# Patient Record
Sex: FEMALE | Race: WHITE | NOT HISPANIC OR LATINO | URBAN - METROPOLITAN AREA
[De-identification: names, ages, dates, MRNs, and addresses within clinical notes are randomized per-mention and may not be internally consistent; named-entity substitution may affect disease eponyms.]

---

## 2022-09-19 ENCOUNTER — EMERGENCY (EMERGENCY)
Facility: HOSPITAL | Age: 42
LOS: 0 days | Discharge: HOME | End: 2022-09-20
Attending: EMERGENCY MEDICINE | Admitting: EMERGENCY MEDICINE

## 2022-09-19 VITALS
TEMPERATURE: 98 F | DIASTOLIC BLOOD PRESSURE: 64 MMHG | HEART RATE: 67 BPM | RESPIRATION RATE: 18 BRPM | SYSTOLIC BLOOD PRESSURE: 150 MMHG | OXYGEN SATURATION: 99 % | WEIGHT: 160.06 LBS

## 2022-09-19 DIAGNOSIS — Z91.040 LATEX ALLERGY STATUS: ICD-10-CM

## 2022-09-19 DIAGNOSIS — R07.9 CHEST PAIN, UNSPECIFIED: ICD-10-CM

## 2022-09-19 DIAGNOSIS — R07.89 OTHER CHEST PAIN: ICD-10-CM
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
## 2022-09-19 DIAGNOSIS — M25.562 PAIN IN LEFT KNEE: ICD-10-CM

## 2022-09-19 DIAGNOSIS — Z85.41 PERSONAL HISTORY OF MALIGNANT NEOPLASM OF CERVIX UTERI: ICD-10-CM
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
## 2022-09-19 DIAGNOSIS — Z88.0 ALLERGY STATUS TO PENICILLIN: ICD-10-CM

## 2022-09-19 LAB
ALBUMIN SERPL ELPH-MCNC: 4.7 G/DL — SIGNIFICANT CHANGE UP (ref 3.5–5.2)
ALP SERPL-CCNC: 109 U/L — SIGNIFICANT CHANGE UP (ref 30–115)
ALT FLD-CCNC: 14 U/L — SIGNIFICANT CHANGE UP (ref 0–41)
ANION GAP SERPL CALC-SCNC: 12 MMOL/L — SIGNIFICANT CHANGE UP (ref 7–14)
AST SERPL-CCNC: 26 U/L — SIGNIFICANT CHANGE UP (ref 0–41)
BASOPHILS # BLD AUTO: 0.06 K/UL — SIGNIFICANT CHANGE UP (ref 0–0.2)
BASOPHILS NFR BLD AUTO: 0.6 % — SIGNIFICANT CHANGE UP (ref 0–1)
BILIRUB SERPL-MCNC: 0.5 MG/DL — SIGNIFICANT CHANGE UP (ref 0.2–1.2)
BUN SERPL-MCNC: 7 MG/DL — LOW (ref 10–20)
CALCIUM SERPL-MCNC: 9.9 MG/DL — SIGNIFICANT CHANGE UP (ref 8.4–10.4)
CHLORIDE SERPL-SCNC: 103 MMOL/L — SIGNIFICANT CHANGE UP (ref 98–110)
CO2 SERPL-SCNC: 25 MMOL/L — SIGNIFICANT CHANGE UP (ref 17–32)
CREAT SERPL-MCNC: 0.6 MG/DL — LOW (ref 0.7–1.5)
EGFR: 117 ML/MIN/1.73M2 — SIGNIFICANT CHANGE UP
EOSINOPHIL # BLD AUTO: 0.36 K/UL — SIGNIFICANT CHANGE UP (ref 0–0.7)
EOSINOPHIL NFR BLD AUTO: 3.6 % — SIGNIFICANT CHANGE UP (ref 0–8)
GLUCOSE SERPL-MCNC: 80 MG/DL — SIGNIFICANT CHANGE UP (ref 70–99)
HCG SERPL QL: NEGATIVE — SIGNIFICANT CHANGE UP
HCT VFR BLD CALC: 44 % — SIGNIFICANT CHANGE UP (ref 37–47)
HGB BLD-MCNC: 14.8 G/DL — SIGNIFICANT CHANGE UP (ref 12–16)
IMM GRANULOCYTES NFR BLD AUTO: 0.2 % — SIGNIFICANT CHANGE UP (ref 0.1–0.3)
LYMPHOCYTES # BLD AUTO: 2.95 K/UL — SIGNIFICANT CHANGE UP (ref 1.2–3.4)
LYMPHOCYTES # BLD AUTO: 29.4 % — SIGNIFICANT CHANGE UP (ref 20.5–51.1)
MCHC RBC-ENTMCNC: 30.8 PG — SIGNIFICANT CHANGE UP (ref 27–31)
MCHC RBC-ENTMCNC: 33.6 G/DL — SIGNIFICANT CHANGE UP (ref 32–37)
MCV RBC AUTO: 91.5 FL — SIGNIFICANT CHANGE UP (ref 81–99)
MONOCYTES # BLD AUTO: 0.91 K/UL — HIGH (ref 0.1–0.6)
MONOCYTES NFR BLD AUTO: 9.1 % — SIGNIFICANT CHANGE UP (ref 1.7–9.3)
NEUTROPHILS # BLD AUTO: 5.74 K/UL — SIGNIFICANT CHANGE UP (ref 1.4–6.5)
NEUTROPHILS NFR BLD AUTO: 57.1 % — SIGNIFICANT CHANGE UP (ref 42.2–75.2)
NRBC # BLD: 0 /100 WBCS — SIGNIFICANT CHANGE UP (ref 0–0)
PLATELET # BLD AUTO: 239 K/UL — SIGNIFICANT CHANGE UP (ref 130–400)
POTASSIUM SERPL-MCNC: 4.5 MMOL/L — SIGNIFICANT CHANGE UP (ref 3.5–5)
POTASSIUM SERPL-SCNC: 4.5 MMOL/L — SIGNIFICANT CHANGE UP (ref 3.5–5)
PROT SERPL-MCNC: 7.3 G/DL — SIGNIFICANT CHANGE UP (ref 6–8)
RBC # BLD: 4.81 M/UL — SIGNIFICANT CHANGE UP (ref 4.2–5.4)
RBC # FLD: 13.2 % — SIGNIFICANT CHANGE UP (ref 11.5–14.5)
SODIUM SERPL-SCNC: 140 MMOL/L — SIGNIFICANT CHANGE UP (ref 135–146)
TROPONIN T SERPL-MCNC: <0.01 NG/ML — SIGNIFICANT CHANGE UP
WBC # BLD: 10.04 K/UL — SIGNIFICANT CHANGE UP (ref 4.8–10.8)
WBC # FLD AUTO: 10.04 K/UL — SIGNIFICANT CHANGE UP (ref 4.8–10.8)

## 2022-09-19 PROCEDURE — 99285 EMERGENCY DEPT VISIT HI MDM: CPT

## 2022-09-19 PROCEDURE — 73564 X-RAY EXAM KNEE 4 OR MORE: CPT | Mod: 26,LT

## 2022-09-19 PROCEDURE — 71046 X-RAY EXAM CHEST 2 VIEWS: CPT | Mod: 26

## 2022-09-19 PROCEDURE — 93010 ELECTROCARDIOGRAM REPORT: CPT

## 2022-09-19 RX ORDER — KETOROLAC TROMETHAMINE 30 MG/ML
30 SYRINGE (ML) INJECTION ONCE
Refills: 0 | Status: DISCONTINUED | OUTPATIENT
Start: 2022-09-19 | End: 2022-09-19

## 2022-09-19 RX ADMIN — Medication 30 MILLIGRAM(S): at 21:50

## 2022-09-19 NOTE — ED ADULT NURSE NOTE - CHIEF COMPLAINT QUOTE
Pt BIBA for left knee pain and abdominal pain. Pt under [facility] custody. denies n/v/d. Pt fell a few weeks ago.

## 2022-09-19 NOTE — ED PROVIDER NOTE - NSFOLLOWUPINSTRUCTIONS_ED_ALL_ED_FT
Chest Pain    Chest pain can be caused by many different conditions which may or may not be dangerous. Causes include heartburn, lung infections, heart attack, blood clot in lungs, skin infections, strain or damage to muscle, cartilage, or bones, etc. In addition to a history and physical examination, an electrocardiogram (ECG) or other lab tests may have been performed to determine the cause of your chest pain. Follow up with your primary care provider or with a cardiologist as instructed.     SEEK IMMEDIATE MEDICAL CARE IF YOU HAVE ANY OF THE FOLLOWING SYMPTOMS: worsening chest pain, coughing up blood, unexplained back/neck/jaw pain, severe abdominal pain, dizziness or lightheadedness, fainting, shortness of breath, sweaty or clammy skin, vomiting, or racing heart beat. These symptoms may represent a serious problem that is an emergency. Do not wait to see if the symptoms will go away. Get medical help right away. Call 911 and do not drive yourself to the hospital.      Acute Knee Pain, Adult      Acute knee pain is sudden and may be caused by damage, swelling, or irritation of the muscles and tissues that support the knee. Pain may result from:  •A fall.      •An injury to the knee from twisting motions.      •A hit to the knee.      •Infection.      Acute knee pain may go away on its own with time and rest. If it does not, your health care provider may order tests to find the cause of the pain. These may include:  •Imaging tests, such as an X-ray, MRI, CT scan, or ultrasound.      •Joint aspiration. In this test, fluid is removed from the knee and evaluated.      •Arthroscopy. In this test, a lighted tube is inserted into the knee and an image is projected onto a TV screen.      •Biopsy. In this test, a sample of tissue is removed from the body and studied under a microscope.        Follow these instructions at home:      If you have a knee sleeve or brace:      •Wear the knee sleeve or brace as told by your health care provider. Remove it only as told by your health care provider.      •Loosen it if your toes tingle, become numb, or turn cold and blue.      •Keep it clean.    •If the knee sleeve or brace is not waterproof:  •Do not let it get wet.      •Cover it with a watertight covering when you take a bath or shower.        Activity     •Rest your knee.      • Do not do things that cause pain or make pain worse.      •Avoid high-impact activities or exercises, such as running, jumping rope, or doing jumping jacks.      •Work with a physical therapist to make a safe exercise program, as recommended by your health care provider. Do exercises as told by your physical therapist.        Managing pain, stiffness, and swelling    •If directed, put ice on the affected knee. To do this:  •If you have a removable knee sleeve or brace, remove it as told by your health care provider.      •Put ice in a plastic bag.      •Place a towel between your skin and the bag.      •Leave the ice on for 20 minutes, 2–3 times a day.      •Remove the ice if your skin turns bright red. This is very important. If you cannot feel pain, heat, or cold, you have a greater risk of damage to the area.        •If directed, use an elastic bandage to put pressure (compression) on your injured knee. This may control swelling, give support, and help with discomfort.      •Raise (elevate) your knee above the level of your heart while you are sitting or lying down.      •Sleep with a pillow under your knee.      General instructions     •Take over-the-counter and prescription medicines only as told by your health care provider.      • Do not use any products that contain nicotine or tobacco, such as cigarettes, e-cigarettes, and chewing tobacco. If you need help quitting, ask your health care provider.      •If you are overweight, work with your health care provider and a dietitian to set a weight-loss goal that is healthy and reasonable for you. Extra weight can put pressure on your knee.      •Pay attention to any changes in your symptoms.      •Keep all follow-up visits. This is important.        Contact a health care provider if:    •Your knee pain continues, changes, or gets worse.      •You have a fever along with knee pain.      •Your knee feels warm to the touch or is red.      •Your knee isac or locks up.        Get help right away if:    •Your knee swells, and the swelling becomes worse.      •You cannot move your knee.      •You have severe pain in your knee that cannot be managed with pain medicine.        Summary    •Acute knee pain can be caused by a fall, an injury, an infection, or damage, swelling, or irritation of the tissues that support your knee.      •Your health care provider may perform tests to find out the cause of the pain.      •Pay attention to any changes in your symptoms. Relieve your pain with rest, medicines, light activity, and the use of ice.      •Get help right away if your knee swells, you cannot move your knee, or you have severe pain that cannot be managed with medicine.      This information is not intended to replace advice given to you by your health care provider. Make sure you discuss any questions you have with your health care provider.

## 2022-09-19 NOTE — ED PROVIDER NOTE - OBJECTIVE STATEMENT
40 yo female, PMHx of cervical cancer, presents with sudden on chest pain that started earlier this afternoon when she was being arrested, no alleviating or aggravating factors, associated with left knee pain for a few weeks after she fell with a "locking sensation." Denies nausea, vomiting, dizziness, shortness of breath, abdominal pain.

## 2022-09-19 NOTE — ED PROVIDER NOTE - PHYSICAL EXAMINATION
CONSTITUTIONAL: disheveled  SKIN: warm, dry  HEAD: Normocephalic; atraumatic.  NECK: Supple; non tender.  CARD: S1, S2 normal; Regular rate and rhythm.   RESP: No wheezes, rales or rhonchi.  ABD: soft ntnd  EXT: Normal ROM.  No clubbing, cyanosis or edema. ambulates independently   NEURO: Alert, oriented, grossly unremarkable.  PSYCH: Cooperative, appropriate. CONSTITUTIONAL: no acute distress  SKIN: warm, dry  HEAD: Normocephalic; atraumatic.  NECK: Supple; non tender.  CARD: S1, S2 normal; Regular rate and rhythm.   RESP: No wheezes, rales or rhonchi.  ABD: soft ntnd  EXT: Normal ROM.  No clubbing, cyanosis or edema. ambulates independently   NEURO: Alert, oriented, grossly unremarkable.  PSYCH: Cooperative, appropriate.

## 2022-09-19 NOTE — ED PROVIDER NOTE - PATIENT PORTAL LINK FT
You can access the FollowMyHealth Patient Portal offered by [facility] by registering at the following website: [email or web address]. By joining [employer]’s FollowMyHealth portal, you will also be able to view your health information using other applications (apps) compatible with our system.

## 2022-09-19 NOTE — ED PROVIDER NOTE - ATTENDING CONTRIBUTION TO CARE
41F pmh cervical ca, presents in [facility] custody c/o primarily L knee pain. states she fell on it 2 weeks ago and has had sharp constant nonradiating pain ever since. no numbness, weakness. no swelling. able to bear weight but with pain. also c/o L sided sharp cp today started after getting arrested around 2 pm last a few mins. sharp nonradiating intermittent. no sob, palp, n/v, diaphoresis. no tearing bp. no fever, cough. no trauma today. no le edema, le pain immobilization, hormones, hemoptysis. pt states knee pain is worse.     on exam, AFVSS, well olimpia nad, ncat, eomi, perrla, mmm, lctab, rrr nl s1s2 no mrg, abd soft ntnd, aaox3, no focal deficits, no le edema or calf ttp,    a/p; L knee pain, CP, will do labs, ekg/trop, cxr, knee xr, toradol re-eval. PERC neg.

## 2022-09-19 NOTE — ED PROVIDER NOTE - CLINICAL SUMMARY MEDICAL DECISION MAKING FREE TEXT BOX
pt feels better, ed work up unremarkable, dc w supportive care, f/u pmd/cards 1-2 weeks, strict return precautions

## 2022-09-19 NOTE — ED ADULT TRIAGE NOTE - CHIEF COMPLAINT QUOTE
Pt BIBA for left knee pain and abdominal pain. Pt under [facility] custody. denies n/v/d. Pt fell a few weeks ago.

## 2022-09-19 NOTE — ED PROVIDER NOTE - NSFOLLOWUPCLINICS_GEN_ALL_ED_FT
[facility]  Cardiac Rehab  [address] [place] [address]  Phone: [phone]  Fax:   Follow Up Time: Routine

## 2022-10-31 NOTE — ED ADULT TRIAGE NOTE - WEIGHT IN KG
72.6 PAST MEDICAL HISTORY:  Alcohol abuse     Anxiety     Depression     Overdose heroine and xanax 3/10/2015

## 2024-01-15 ENCOUNTER — EMERGENCY (EMERGENCY)
Facility: HOSPITAL | Age: 44
LOS: 0 days | Discharge: HOME | End: 2024-01-15
Attending: EMERGENCY MEDICINE
Payer: MEDICAID

## 2024-01-15 VITALS
WEIGHT: 171.08 LBS | HEART RATE: 65 BPM | RESPIRATION RATE: 16 BRPM | SYSTOLIC BLOOD PRESSURE: 119 MMHG | OXYGEN SATURATION: 100 % | TEMPERATURE: 98 F | DIASTOLIC BLOOD PRESSURE: 88 MMHG

## 2024-01-15 DIAGNOSIS — M79.89 OTHER SPECIFIED SOFT TISSUE DISORDERS: ICD-10-CM

## 2024-01-15 DIAGNOSIS — R20.2 PARESTHESIA OF SKIN: ICD-10-CM

## 2024-01-15 DIAGNOSIS — Z91.040 LATEX ALLERGY STATUS: ICD-10-CM

## 2024-01-15 DIAGNOSIS — Z88.0 ALLERGY STATUS TO PENICILLIN: ICD-10-CM

## 2024-01-15 DIAGNOSIS — M79.602 PAIN IN LEFT ARM: ICD-10-CM

## 2024-01-15 DIAGNOSIS — M25.512 PAIN IN LEFT SHOULDER: ICD-10-CM

## 2024-01-15 PROCEDURE — 99284 EMERGENCY DEPT VISIT MOD MDM: CPT

## 2024-01-15 PROCEDURE — 99284 EMERGENCY DEPT VISIT MOD MDM: CPT | Mod: 25

## 2024-01-15 PROCEDURE — 93010 ELECTROCARDIOGRAM REPORT: CPT

## 2024-01-15 PROCEDURE — 71045 X-RAY EXAM CHEST 1 VIEW: CPT | Mod: 26

## 2024-01-15 PROCEDURE — 93005 ELECTROCARDIOGRAM TRACING: CPT

## 2024-01-15 PROCEDURE — 73030 X-RAY EXAM OF SHOULDER: CPT | Mod: 26,LT

## 2024-01-15 PROCEDURE — 71045 X-RAY EXAM CHEST 1 VIEW: CPT

## 2024-01-15 PROCEDURE — 73030 X-RAY EXAM OF SHOULDER: CPT | Mod: LT

## 2024-01-15 RX ORDER — IBUPROFEN 200 MG
600 TABLET ORAL ONCE
Refills: 0 | Status: DISCONTINUED | OUTPATIENT
Start: 2024-01-15 | End: 2024-01-15

## 2024-01-15 NOTE — ED PROVIDER NOTE - NSPTACCESSSVCSAPPTDETAILS_ED_ALL_ED_FT
Please refer for hx of cervical cancer 17 yrs ago and pt would like to check in   Please refer for phyical therapy for shoulder pain 1. Please refer for hx of cervical cancer 17 yrs ago and pt would like to check in   2. Please refer for phyical therapy for shoulder pain.  3. Needs PMD

## 2024-01-15 NOTE — ED PROVIDER NOTE - CLINICAL SUMMARY MEDICAL DECISION MAKING FREE TEXT BOX
43 y.o. female, PMH of cervical cancer in remission, presents for 2 days of shoulder pain and tingling.  Patient states she woke up in the morning with arm pain, mostly over forearm. No limitation of ROM. Patient denies any trauma to her neck or arm. No fever/chills. No CP/SOB. No IVDA. No HA. No skin changes. On exam, pt in NAD, AAOx3, head NC/AT, CN II-XII intact, PEERL, EOMi, neck (-) midline tenderness, lungs CTA B/L, CV S1S2 regular, abdomen soft/NT/ND/(+)BS, ext (-) edema/deformity, FROM of b/l shoulders/elbows/wrist, motor 5/5x4, sensation intact, ambulating with steady gait. XR done and reviewed- no acute pathology. Will d/c with PT and neuro follow up. Will do referral for PMD as well.

## 2024-01-15 NOTE — ED PROVIDER NOTE - NSFOLLOWUPINSTRUCTIONS_ED_ALL_ED_FT
Arm Pain    WHAT YOU NEED TO KNOW:    Your arm pain may be caused by a number of conditions. Examples include arthritis, nerve problems, or an awkward position while you sleep. X-rays did not show a broken bone in your arm or wrist. Arm pain may be a sign of a serious condition that needs immediate care, such as a heart attack.    DISCHARGE INSTRUCTIONS:    Call your local emergency number (911 in the [place]) for any of the following:    You have any of the following signs of a heart attack:  Squeezing, pressure, or pain in your chest    You may also have any of the following:  Discomfort or pain in your back, neck, jaw, stomach, or arm    Shortness of breath    Nausea or vomiting    Lightheadedness or a sudden cold sweat    Return to the emergency department if:    You have severe pain, or pain that spreads from your arm to other areas.    You have swelling, tingling, or numbness in your hand or fingers, or the skin turns blue.    You cannot move your arm.  Call your doctor if:    You have questions or concerns about your condition or care.    Medicines: You may need any of the following:    Prescription pain medicine may be given. Ask your healthcare provider how to take this medicine safely. Some prescription pain medicines contain acetaminophen. Do not take other medicines that contain acetaminophen without talking to your healthcare provider. Too much acetaminophen may cause liver damage. Prescription pain medicine may cause constipation. Ask your healthcare provider how to prevent or treat constipation.    NSAIDs, such as ibuprofen, help decrease swelling, pain, and fever. This medicine is available with or without a doctor's order. NSAIDs can cause stomach bleeding or kidney problems in certain people. If you take blood thinner medicine, always ask your healthcare provider if NSAIDs are safe for you. Always read the medicine label and follow directions.    Take your medicine as directed. Contact your healthcare provider if you think your medicine is not helping or if you have side effects. Tell your provider if you are allergic to any medicine. Keep a list of the medicines, vitamins, and herbs you take. Include the amounts, and when and why you take them. Bring the list or the pill bottles to follow-up visits. Carry your medicine list with you in case of an emergency.  Self-care:    Rest your arm as directed. A sling may be used to keep your arm from moving while it heals.    Apply ice as directed. Ice helps decrease pain and swelling. Ice may also help prevent tissue damage. Use an ice pack, or put crushed ice in a plastic bag. Cover it with a towel. Apply it to your arm for 20 minutes every few hours, or as directed. Ask how many times to apply ice each day, and for how many days.    Elevate your arm above the level of your heart as often as you can. This will help decrease swelling and pain. Prop your arm on pillows or blankets to keep the area elevated comfortably.        Adjust your position if you work in front of a computer. You may need arm or wrist supports or change the height of your chair.    Keep a pain record. Write down when your pain happens and how severe it is. Include any other symptoms you have with your pain. A record will help you keep track of pain cycles. Bring the record with you to your follow-up visits. It may also help your healthcare provider find out what is causing your pain.  Follow up with your doctor as directed: You may need physical therapy. You may need to see an orthopedic specialist. Write down your questions so you remember to ask them during your visits.    © [employer] 1973, 2024

## 2024-01-15 NOTE — ED ADULT NURSE NOTE - NSFALLUNIVINTERV_ED_ALL_ED
Assistance with ambulation/Monitor gait and stability/Monitor for mental status changes and reorient to person, place, and time, as needed/Provide visual cue: red socks, yellow wristband, yellow gown, etc/Reinforce activity limits and safety measures with patient and family/Use of alarms - bed, stretcher, chair and/or video monitoring/Bed/Stretcher in lowest position, wheels locked, appropriate side rails in place/Call bell, personal items and telephone in reach/Instruct patient to call for assistance before getting out of bed/chair/stretcher/Non-slip footwear applied when patient is off stretcher/[place] to call system/Physically safe environment - no spills, clutter or unnecessary equipment/Purposeful proactive rounding/Room/bathroom lighting operational, light cord in reach Assistance with ambulation/Monitor gait and stability/Monitor for mental status changes and reorient to person, place, and time, as needed/Provide visual cue: red socks, yellow wristband, yellow gown, etc/Reinforce activity limits and safety measures with patient and family/Use of alarms - bed, stretcher, chair and/or video monitoring/Bed/Stretcher in lowest position, wheels locked, appropriate side rails in place/Call bell, personal items and telephone in reach/Instruct patient to call for assistance before getting out of bed/chair/stretcher/Non-slip footwear applied when patient is off stretcher/[place] to call system/Physically safe environment - no spills, clutter or unnecessary equipment/Purposeful proactive rounding/Room/bathroom lighting operational, light cord in reach Assistance with ambulation/Monitor gait and stability/Monitor for mental status changes and reorient to person, place, and time, as needed/Provide visual cue: red socks, yellow wristband, yellow gown, etc/Reinforce activity limits and safety measures with patient and family/Use of alarms - bed, stretcher, chair and/or video monitoring/Bed/Stretcher in lowest position, wheels locked, appropriate side rails in place/Call bell, personal items and telephone in reach/Instruct patient to call for assistance before getting out of bed/chair/stretcher/Non-slip footwear applied when patient is off stretcher/[place] to call system/Physically safe environment - no spills, clutter or unnecessary equipment/Purposeful proactive rounding/Room/bathroom lighting operational, light cord in reach

## 2024-01-15 NOTE — ED PROVIDER NOTE - PATIENT PORTAL LINK FT
You can access the FollowMyHealth Patient Portal offered by [facility] by registering at the following website: [email or web address]. By joining [employer]’s FollowMyHealth portal, you will also be able to view your health information using other applications (apps) compatible with our system. You can access the FollowMyHealth Patient Portal offered by [facility] by registering at the following website: [email or web address]. By joining [employer]’s FollowMyHealth portal, you will also be able to view your health information using other applications (apps) compatible with our system. You can access the FollowMyHealth Patient Portal offered by [facility] by registering at the following website: [email or web address]. By joining [employer]’s FollowMyHealth portal, you will also be able to view your health information using other applications (apps) compatible with our system.

## 2024-01-15 NOTE — ED PROVIDER NOTE - OBJECTIVE STATEMENT
43-year-old female past medical history of cervical cancer in remission presents for 2 days of shoulder pain and tingling.  Patient states she woke up in the morning and her arm hurt extra is able to have full range of motion but she thought her distal hand was swollen.  Patient denies any trauma to the cervical or arm regions fevers chills chest pain shortness of breath.

## 2024-01-15 NOTE — ED ADULT TRIAGE NOTE - CHIEF COMPLAINT QUOTE
Left arm pain for "the past few days", pt reports when she wakes up in the mornings her arm is numb and she gets pins and needles, also reports some swelling. Denies injury.

## 2024-02-01 ENCOUNTER — INPATIENT (INPATIENT)
Facility: HOSPITAL | Age: 44
LOS: 3 days | Discharge: LEFT BEFORE DISCHARGE | DRG: 816 | End: 2024-02-05
Attending: INTERNAL MEDICINE | Admitting: INTERNAL MEDICINE
Payer: MEDICAID

## 2024-02-01 VITALS
SYSTOLIC BLOOD PRESSURE: 175 MMHG | HEART RATE: 88 BPM | OXYGEN SATURATION: 94 % | DIASTOLIC BLOOD PRESSURE: 110 MMHG | WEIGHT: 149.91 LBS | RESPIRATION RATE: 88 BRPM | TEMPERATURE: 98 F

## 2024-02-01 DIAGNOSIS — T40.2X1A POISONING BY OTHER OPIOIDS, ACCIDENTAL (UNINTENTIONAL), INITIAL ENCOUNTER: ICD-10-CM

## 2024-02-01 LAB
ACANTHOCYTES BLD QL SMEAR: SLIGHT — SIGNIFICANT CHANGE UP
ALBUMIN SERPL ELPH-MCNC: 4.7 G/DL — SIGNIFICANT CHANGE UP (ref 3.5–5.2)
ALP SERPL-CCNC: 119 U/L — HIGH (ref 30–115)
ALT FLD-CCNC: 73 U/L — HIGH (ref 0–41)
ANION GAP SERPL CALC-SCNC: 17 MMOL/L — HIGH (ref 7–14)
APAP SERPL-MCNC: <5 UG/ML — LOW (ref 10–30)
AST SERPL-CCNC: 179 U/L — HIGH (ref 0–41)
BASE EXCESS BLDV CALC-SCNC: -11.4 MMOL/L — LOW (ref -2–3)
BASOPHILS # BLD AUTO: 0 K/UL — SIGNIFICANT CHANGE UP (ref 0–0.2)
BASOPHILS NFR BLD AUTO: 0 % — SIGNIFICANT CHANGE UP (ref 0–1)
BILIRUB SERPL-MCNC: 0.4 MG/DL — SIGNIFICANT CHANGE UP (ref 0.2–1.2)
BUN SERPL-MCNC: 12 MG/DL — SIGNIFICANT CHANGE UP (ref 10–20)
BURR CELLS BLD QL SMEAR: PRESENT — SIGNIFICANT CHANGE UP
CA-I SERPL-SCNC: 1.19 MMOL/L — SIGNIFICANT CHANGE UP (ref 1.15–1.33)
CALCIUM SERPL-MCNC: 9.2 MG/DL — SIGNIFICANT CHANGE UP (ref 8.4–10.5)
CHLORIDE SERPL-SCNC: 107 MMOL/L — SIGNIFICANT CHANGE UP (ref 98–110)
CO2 SERPL-SCNC: 18 MMOL/L — SIGNIFICANT CHANGE UP (ref 17–32)
CREAT SERPL-MCNC: 0.9 MG/DL — SIGNIFICANT CHANGE UP (ref 0.7–1.5)
EGFR: 82 ML/MIN/1.73M2 — SIGNIFICANT CHANGE UP
EOSINOPHIL # BLD AUTO: 0.21 K/UL — SIGNIFICANT CHANGE UP (ref 0–0.7)
EOSINOPHIL NFR BLD AUTO: 0.8 % — SIGNIFICANT CHANGE UP (ref 0–8)
ETHANOL SERPL-MCNC: 14 MG/DL — HIGH
GAS PNL BLDV: 134 MMOL/L — LOW (ref 136–145)
GAS PNL BLDV: SIGNIFICANT CHANGE UP
GIANT PLATELETS BLD QL SMEAR: PRESENT — SIGNIFICANT CHANGE UP
GLUCOSE SERPL-MCNC: 188 MG/DL — HIGH (ref 70–99)
HCO3 BLDV-SCNC: 20 MMOL/L — LOW (ref 22–29)
HCT VFR BLD CALC: 45.3 % — SIGNIFICANT CHANGE UP (ref 37–47)
HCT VFR BLDA CALC: 44 % — SIGNIFICANT CHANGE UP (ref 34.5–46.5)
HGB BLD CALC-MCNC: 14.5 G/DL — SIGNIFICANT CHANGE UP (ref 11.7–16.1)
HGB BLD-MCNC: 14.4 G/DL — SIGNIFICANT CHANGE UP (ref 12–16)
HYPOCHROMIA BLD QL: SLIGHT — SIGNIFICANT CHANGE UP
LACTATE BLDV-MCNC: 6.1 MMOL/L — CRITICAL HIGH (ref 0.5–2)
LIDOCAIN IGE QN: 64 U/L — HIGH (ref 7–60)
LYMPHOCYTES # BLD AUTO: 1.77 K/UL — SIGNIFICANT CHANGE UP (ref 1.2–3.4)
LYMPHOCYTES # BLD AUTO: 6.9 % — LOW (ref 20.5–51.1)
MAGNESIUM SERPL-MCNC: 2 MG/DL — SIGNIFICANT CHANGE UP (ref 1.8–2.4)
MANUAL SMEAR VERIFICATION: SIGNIFICANT CHANGE UP
MCHC RBC-ENTMCNC: 30.6 PG — SIGNIFICANT CHANGE UP (ref 27–31)
MCHC RBC-ENTMCNC: 31.8 G/DL — LOW (ref 32–37)
MCV RBC AUTO: 96.4 FL — SIGNIFICANT CHANGE UP (ref 81–99)
METAMYELOCYTES # FLD: 0.9 % — HIGH (ref 0–0)
MONOCYTES # BLD AUTO: 0.21 K/UL — SIGNIFICANT CHANGE UP (ref 0.1–0.6)
MONOCYTES NFR BLD AUTO: 0.8 % — LOW (ref 1.7–9.3)
NEUTROPHILS # BLD AUTO: 23.06 K/UL — HIGH (ref 1.4–6.5)
NEUTROPHILS NFR BLD AUTO: 88.8 % — HIGH (ref 42.2–75.2)
NEUTS BAND # BLD: 0.9 % — SIGNIFICANT CHANGE UP (ref 0–6)
PCO2 BLDV: 66 MMHG — HIGH (ref 39–42)
PH BLDV: 7.08 — CRITICAL LOW (ref 7.32–7.43)
PLAT MORPH BLD: ABNORMAL
PLATELET # BLD AUTO: 297 K/UL — SIGNIFICANT CHANGE UP (ref 130–400)
PMV BLD: 10.5 FL — HIGH (ref 7.4–10.4)
PO2 BLDV: 35 MMHG — SIGNIFICANT CHANGE UP (ref 25–45)
POIKILOCYTOSIS BLD QL AUTO: SLIGHT — SIGNIFICANT CHANGE UP
POLYCHROMASIA BLD QL SMEAR: SLIGHT — SIGNIFICANT CHANGE UP
POTASSIUM BLDV-SCNC: 5.8 MMOL/L — HIGH (ref 3.5–5.1)
POTASSIUM SERPL-MCNC: 4.7 MMOL/L — SIGNIFICANT CHANGE UP (ref 3.5–5)
POTASSIUM SERPL-SCNC: 4.7 MMOL/L — SIGNIFICANT CHANGE UP (ref 3.5–5)
PROT SERPL-MCNC: 7.2 G/DL — SIGNIFICANT CHANGE UP (ref 6–8)
RBC # BLD: 4.7 M/UL — SIGNIFICANT CHANGE UP (ref 4.2–5.4)
RBC # FLD: 12.7 % — SIGNIFICANT CHANGE UP (ref 11.5–14.5)
RBC BLD AUTO: ABNORMAL
SALICYLATES SERPL-MCNC: 0.8 MG/DL — LOW (ref 4–30)
SAO2 % BLDV: 45 % — LOW (ref 67–88)
SODIUM SERPL-SCNC: 142 MMOL/L — SIGNIFICANT CHANGE UP (ref 135–146)
VARIANT LYMPHS # BLD: 0.9 % — SIGNIFICANT CHANGE UP (ref 0–5)
WBC # BLD: 25.71 K/UL — HIGH (ref 4.8–10.8)
WBC # FLD AUTO: 25.71 K/UL — HIGH (ref 4.8–10.8)

## 2024-02-01 PROCEDURE — 71045 X-RAY EXAM CHEST 1 VIEW: CPT | Mod: 26

## 2024-02-01 PROCEDURE — 93010 ELECTROCARDIOGRAM REPORT: CPT

## 2024-02-01 PROCEDURE — 99291 CRITICAL CARE FIRST HOUR: CPT

## 2024-02-01 RX ORDER — SODIUM CHLORIDE 9 MG/ML
1000 INJECTION, SOLUTION INTRAVENOUS ONCE
Refills: 0 | Status: COMPLETED | OUTPATIENT
Start: 2024-02-01 | End: 2024-02-01

## 2024-02-01 RX ORDER — ONDANSETRON 8 MG/1
4 TABLET, FILM COATED ORAL ONCE
Refills: 0 | Status: COMPLETED | OUTPATIENT
Start: 2024-02-01 | End: 2024-02-01

## 2024-02-01 RX ADMIN — SODIUM CHLORIDE 1000 MILLILITER(S): 9 INJECTION, SOLUTION INTRAVENOUS at 20:32

## 2024-02-01 RX ADMIN — ONDANSETRON 4 MILLIGRAM(S): 8 TABLET, FILM COATED ORAL at 20:32

## 2024-02-01 RX ADMIN — SODIUM CHLORIDE 1000 MILLILITER(S): 9 INJECTION, SOLUTION INTRAVENOUS at 23:01

## 2024-02-01 NOTE — ED PROVIDER NOTE - CLINICAL SUMMARY MEDICAL DECISION MAKING FREE TEXT BOX
Patient brought in for evaluation after overdose in fields, given Narcan with vomiting experience.  Patient observed in ED with recurrence of symptoms, Narcan again given patient had episode of vomiting.  Patient with x-ray suggestive of possible aspiration pneumonia, given overdose requiring several doses of Narcan toxicology consulted, patient admitted to ICU for further monitoring evaluation and treatment

## 2024-02-01 NOTE — ED PROVIDER NOTE - CARE PLAN
Principal Discharge DX:	Overdose   1 Principal Discharge DX:	Pneumonia, aspiration  Secondary Diagnosis:	Overdose

## 2024-02-01 NOTE — ED PROVIDER NOTE - ATTENDING CONTRIBUTION TO CARE
44 y/o F, PMH of cocaine and alcohol abuse, BIBEMS after being found unresponsive on the ground in parking lot behind a liquor store.  walked away from patient for a few minutes and came back to finding patient on the ground, does not know what patient smoked.  and patient drank vodka earlier today, patient drinks at least 1 pint of alcohol daily. EMS arrived and gave 2 doses of IN narcan (2mg each), patient became more awake, last one given 15min prior to arrival. Patient not saying what she took, complains of nausea and vomiting. Actively vomiting in the ED.  denies patient having history of IV drug use. On exam, pt in NAD, awake and alert, head NC/AT, CN II-XII intact, PEERL, EOMi, neck (-) midline tenderness, lungs CTA B/L, CV S1S2 regular, abdomen soft/NT/ND/(+)BS, ext (-) edema, motor 5/5x4, sensation intact. WIll do labs, IVF/Zofran and reevaluate.

## 2024-02-01 NOTE — ED PROVIDER NOTE - PRO INTERPRETER NEED 2
Pt. Walk-in from home with spouse. C/o right sided severe headache that started 3 days ago. Headache has been intermittent since then. C/o some nausea associated with headache and light sensitivity and double vision in her right eye. Denies vomiting. Pt. States her coordination has been \"off\" starting yesterday and noticed right sided weakness in RLE. CP and SOB started this morning. Pt has history of asthma. Pt took albuterol inhaler this evening before coming to ED.      Denies fevers, chills.              Adrien Zuniga RN  02/02/22 4052
English

## 2024-02-01 NOTE — ED PROVIDER NOTE - PROGRESS NOTE DETAILS
PS: Pt is a 42 y/o female with PMH of alcohol use disorder and substance use disorder (cocaine) and cervical cancer in remission BIBEMS after being found unresponsive. As per , pt smoked marijuana and drank a pint of vodka earlier today. He went to get food and when he came back, found pt unresponsive. EMS administered narcan 2mg IN x 2. Pt awoke and started vomiting. No abdominal pain.     CONST: + dry heaving into emesis bag  HEAD:  normocephalic, atraumatic  EYES:  conjunctivae without injection, drainage or discharge  ENMT:  nasal mucosa moist; mouth moist without ulcerations or lesions; throat moist without erythema, exudate, ulcerations or lesions  NECK:  supple  CARDIAC:  regular rate and rhythm, normal S1 and S2, no murmurs, rubs or gallops  RESP:  respiratory rate and effort appear normal for age; lungs are clear to auscultation bilaterally; no rales or wheezes  ABDOMEN:  soft, nontender, nondistended  MUSCULOSKELETAL/NEURO:  awake and alert, STARKS  SKIN:  normal skin color for age and race, well-perfused; warm and dry    Will obtain labs and reassess. Pt s/o to me by Dr. Meeks to follow up labs and imaging, continue to observe, reassess and dispo. BT: Patient became apneic and was vomiting around 12:40AM, put on 3L NC and given 0.4mg IV narcan. Patient symptoms started to improve. Toxicology was consulted, Fellow Ayush Pompa recommended patient to be started on narcan drip and to be admitted to ICU. CT scan showed opacities in the RML and LLL clinically consistent with aspiration pneumonia.

## 2024-02-01 NOTE — ED PROVIDER NOTE - PHYSICAL EXAMINATION
GENERAL: Well-developed; well-nourished  SKIN: warm, well perfused  HEAD: Normocephalic; atraumatic.  .... GENERAL: Well-developed; well-nourished; in mild acute distress, drowsy but responds to painful stimuli, dry heaving  SKIN: warm, well perfused  HEAD: Normocephalic; atraumatic.  EYES: pinpoint pupils b/l, PERRLA, no conjunctival erythema  ENT: No nasal discharge; airway clear.  CARD: S1, S2 normal; Regular rate and rhythm.   RESP: LCTAB  ABD: soft, nontender, and nondistended  Upper EXT: Normal ROM.  Lower EXT: no edema, Normal ROM.

## 2024-02-01 NOTE — ED ADULT TRIAGE NOTE - CHIEF COMPLAINT QUOTE
BIBA from liquor store overdosed on unknown substance. Patient received 2 doses IN narcan actively vomiting in triage.

## 2024-02-01 NOTE — ED PROVIDER NOTE - OBJECTIVE STATEMENT
44 y/o F, pmhx of cocaine and alcohol abuse, BIBEMS after being found unresponsive on the ground in parking lot behind a liquor store.  walked away from patient for a few minutes and came back to finding patient on the ground, does not know what patient smoked. EMS arrived and gave 2 doses of IN narcan, patient became more awake, last one given 15min prior to arrival. Patient not saying what she took, complains of sob, nausea, vomiting.  denies patient having history of IV drug use. 44 y/o F, pmhx of cocaine and alcohol abuse, BIBEMS after being found unresponsive on the ground in parking lot behind a liquor store.  walked away from patient for a few minutes and came back to finding patient on the ground, does not know what patient smoked.  and patient drank vodka earlier today, patient drinks at least 1 pint of alcohol daily. EMS arrived and gave 2 doses of IN narcan, patient became more awake, last one given 15min prior to arrival. Patient not saying what she took, complains of sob, nausea, vomiting.  denies patient having history of IV drug use. 42 y/o F, pmhx of cocaine and alcohol abuse, BIBEMS after being found unresponsive on the ground in parking lot behind a liquor store.  walked away from patient for a few minutes and came back to finding patient on the ground, does not know what patient smoked. They both also did cocaine earlier today.  and patient drank vodka earlier today, patient drinks at least 1 pint of alcohol daily. EMS arrived and gave 2 doses of 2mg IN narcan, patient became more awake, last one given 15min prior to arrival. Patient not saying what she took, complains of sob, nausea, vomiting.  denies patient having history of IV drug use.

## 2024-02-02 DIAGNOSIS — J18.9 PNEUMONIA, UNSPECIFIED ORGANISM: ICD-10-CM

## 2024-02-02 DIAGNOSIS — T50.901A POISONING BY UNSPECIFIED DRUGS, MEDICAMENTS AND BIOLOGICAL SUBSTANCES, ACCIDENTAL (UNINTENTIONAL), INITIAL ENCOUNTER: ICD-10-CM

## 2024-02-02 LAB
A1C WITH ESTIMATED AVERAGE GLUCOSE RESULT: 5.4 % — SIGNIFICANT CHANGE UP (ref 4–5.6)
ALBUMIN SERPL ELPH-MCNC: 4.7 G/DL — SIGNIFICANT CHANGE UP (ref 3.5–5.2)
ALP SERPL-CCNC: 94 U/L — SIGNIFICANT CHANGE UP (ref 30–115)
ALT FLD-CCNC: 62 U/L — HIGH (ref 0–41)
ANION GAP SERPL CALC-SCNC: 12 MMOL/L — SIGNIFICANT CHANGE UP (ref 7–14)
AST SERPL-CCNC: 80 U/L — HIGH (ref 0–41)
B-OH-BUTYR SERPL-SCNC: 0.4 MMOL/L — SIGNIFICANT CHANGE UP
BASE EXCESS BLDV CALC-SCNC: -6.2 MMOL/L — LOW (ref -2–3)
BASOPHILS # BLD AUTO: 0.02 K/UL — SIGNIFICANT CHANGE UP (ref 0–0.2)
BASOPHILS NFR BLD AUTO: 0.1 % — SIGNIFICANT CHANGE UP (ref 0–1)
BILIRUB SERPL-MCNC: 0.3 MG/DL — SIGNIFICANT CHANGE UP (ref 0.2–1.2)
BUN SERPL-MCNC: 15 MG/DL — SIGNIFICANT CHANGE UP (ref 10–20)
CA-I SERPL-SCNC: 1.19 MMOL/L — SIGNIFICANT CHANGE UP (ref 1.15–1.33)
CALCIUM SERPL-MCNC: 9.2 MG/DL — SIGNIFICANT CHANGE UP (ref 8.4–10.5)
CHLORIDE SERPL-SCNC: 104 MMOL/L — SIGNIFICANT CHANGE UP (ref 98–110)
CHOLEST SERPL-MCNC: 144 MG/DL — SIGNIFICANT CHANGE UP
CO2 SERPL-SCNC: 23 MMOL/L — SIGNIFICANT CHANGE UP (ref 17–32)
CREAT SERPL-MCNC: 0.7 MG/DL — SIGNIFICANT CHANGE UP (ref 0.7–1.5)
D DIMER BLD IA.RAPID-MCNC: 340 NG/ML DDU — HIGH
EGFR: 111 ML/MIN/1.73M2 — SIGNIFICANT CHANGE UP
EOSINOPHIL # BLD AUTO: 0 K/UL — SIGNIFICANT CHANGE UP (ref 0–0.7)
EOSINOPHIL NFR BLD AUTO: 0 % — SIGNIFICANT CHANGE UP (ref 0–8)
ESTIMATED AVERAGE GLUCOSE: 108 MG/DL — SIGNIFICANT CHANGE UP (ref 68–114)
GAS PNL BLDV: 131 MMOL/L — LOW (ref 136–145)
GAS PNL BLDV: SIGNIFICANT CHANGE UP
GLUCOSE SERPL-MCNC: 97 MG/DL — SIGNIFICANT CHANGE UP (ref 70–99)
HCG SERPL QL: NEGATIVE — SIGNIFICANT CHANGE UP
HCO3 BLDV-SCNC: 23 MMOL/L — SIGNIFICANT CHANGE UP (ref 22–29)
HCT VFR BLD CALC: 43.4 % — SIGNIFICANT CHANGE UP (ref 37–47)
HCT VFR BLDA CALC: 40 % — SIGNIFICANT CHANGE UP (ref 34.5–46.5)
HDLC SERPL-MCNC: 72 MG/DL — SIGNIFICANT CHANGE UP
HGB BLD CALC-MCNC: 13.4 G/DL — SIGNIFICANT CHANGE UP (ref 11.7–16.1)
HGB BLD-MCNC: 13.9 G/DL — SIGNIFICANT CHANGE UP (ref 12–16)
IMM GRANULOCYTES NFR BLD AUTO: 0.5 % — HIGH (ref 0.1–0.3)
LACTATE BLDV-MCNC: 1.3 MMOL/L — SIGNIFICANT CHANGE UP (ref 0.5–2)
LACTATE SERPL-SCNC: 0.9 MMOL/L — SIGNIFICANT CHANGE UP (ref 0.7–2)
LIPID PNL WITH DIRECT LDL SERPL: 65 MG/DL — SIGNIFICANT CHANGE UP
LYMPHOCYTES # BLD AUTO: 0.53 K/UL — LOW (ref 1.2–3.4)
LYMPHOCYTES # BLD AUTO: 2.6 % — LOW (ref 20.5–51.1)
MAGNESIUM SERPL-MCNC: 1.7 MG/DL — LOW (ref 1.8–2.4)
MCHC RBC-ENTMCNC: 30.5 PG — SIGNIFICANT CHANGE UP (ref 27–31)
MCHC RBC-ENTMCNC: 32 G/DL — SIGNIFICANT CHANGE UP (ref 32–37)
MCV RBC AUTO: 95.2 FL — SIGNIFICANT CHANGE UP (ref 81–99)
MONOCYTES # BLD AUTO: 0.89 K/UL — HIGH (ref 0.1–0.6)
MONOCYTES NFR BLD AUTO: 4.3 % — SIGNIFICANT CHANGE UP (ref 1.7–9.3)
NEUTROPHILS # BLD AUTO: 18.95 K/UL — HIGH (ref 1.4–6.5)
NEUTROPHILS NFR BLD AUTO: 92.5 % — HIGH (ref 42.2–75.2)
NON HDL CHOLESTEROL: 72 MG/DL — SIGNIFICANT CHANGE UP
NRBC # BLD: 0 /100 WBCS — SIGNIFICANT CHANGE UP (ref 0–0)
NT-PROBNP SERPL-SCNC: 248 PG/ML — SIGNIFICANT CHANGE UP (ref 0–300)
OSMOLALITY SERPL: 291 MOS/KG — SIGNIFICANT CHANGE UP (ref 275–300)
PCO2 BLDV: 62 MMHG — HIGH (ref 39–42)
PH BLDV: 7.18 — CRITICAL LOW (ref 7.32–7.43)
PHOSPHATE SERPL-MCNC: 4.9 MG/DL — SIGNIFICANT CHANGE UP (ref 2.1–4.9)
PLATELET # BLD AUTO: 266 K/UL — SIGNIFICANT CHANGE UP (ref 130–400)
PMV BLD: 11.1 FL — HIGH (ref 7.4–10.4)
PO2 BLDV: 39 MMHG — SIGNIFICANT CHANGE UP (ref 25–45)
POTASSIUM BLDV-SCNC: 3.9 MMOL/L — SIGNIFICANT CHANGE UP (ref 3.5–5.1)
POTASSIUM SERPL-MCNC: 4.9 MMOL/L — SIGNIFICANT CHANGE UP (ref 3.5–5)
POTASSIUM SERPL-SCNC: 4.9 MMOL/L — SIGNIFICANT CHANGE UP (ref 3.5–5)
PROT SERPL-MCNC: 7.1 G/DL — SIGNIFICANT CHANGE UP (ref 6–8)
RAPID RVP RESULT: SIGNIFICANT CHANGE UP
RBC # BLD: 4.56 M/UL — SIGNIFICANT CHANGE UP (ref 4.2–5.4)
RBC # FLD: 12.8 % — SIGNIFICANT CHANGE UP (ref 11.5–14.5)
SAO2 % BLDV: 58.4 % — LOW (ref 67–88)
SARS-COV-2 RNA SPEC QL NAA+PROBE: SIGNIFICANT CHANGE UP
SODIUM SERPL-SCNC: 139 MMOL/L — SIGNIFICANT CHANGE UP (ref 135–146)
TRIGL SERPL-MCNC: 36 MG/DL — SIGNIFICANT CHANGE UP
TSH SERPL-MCNC: 0.73 UIU/ML — SIGNIFICANT CHANGE UP (ref 0.27–4.2)
WBC # BLD: 20.5 K/UL — HIGH (ref 4.8–10.8)
WBC # FLD AUTO: 20.5 K/UL — HIGH (ref 4.8–10.8)

## 2024-02-02 PROCEDURE — 74177 CT ABD & PELVIS W/CONTRAST: CPT | Mod: 26,MA

## 2024-02-02 PROCEDURE — 85379 FIBRIN DEGRADATION QUANT: CPT

## 2024-02-02 PROCEDURE — 87040 BLOOD CULTURE FOR BACTERIA: CPT

## 2024-02-02 PROCEDURE — 93005 ELECTROCARDIOGRAM TRACING: CPT

## 2024-02-02 PROCEDURE — 0225U NFCT DS DNA&RNA 21 SARSCOV2: CPT

## 2024-02-02 PROCEDURE — 93970 EXTREMITY STUDY: CPT | Mod: 26

## 2024-02-02 PROCEDURE — 84100 ASSAY OF PHOSPHORUS: CPT

## 2024-02-02 PROCEDURE — 80307 DRUG TEST PRSMV CHEM ANLYZR: CPT

## 2024-02-02 PROCEDURE — 36415 COLL VENOUS BLD VENIPUNCTURE: CPT

## 2024-02-02 PROCEDURE — 99291 CRITICAL CARE FIRST HOUR: CPT

## 2024-02-02 PROCEDURE — 93970 EXTREMITY STUDY: CPT

## 2024-02-02 PROCEDURE — 83605 ASSAY OF LACTIC ACID: CPT

## 2024-02-02 PROCEDURE — ZZZZZ: CPT

## 2024-02-02 PROCEDURE — 83880 ASSAY OF NATRIURETIC PEPTIDE: CPT

## 2024-02-02 PROCEDURE — 80053 COMPREHEN METABOLIC PANEL: CPT

## 2024-02-02 PROCEDURE — 82962 GLUCOSE BLOOD TEST: CPT

## 2024-02-02 PROCEDURE — 82010 KETONE BODYS QUAN: CPT

## 2024-02-02 PROCEDURE — 83930 ASSAY OF BLOOD OSMOLALITY: CPT

## 2024-02-02 PROCEDURE — 83735 ASSAY OF MAGNESIUM: CPT

## 2024-02-02 PROCEDURE — 70450 CT HEAD/BRAIN W/O DYE: CPT | Mod: 26,MA

## 2024-02-02 PROCEDURE — 80061 LIPID PANEL: CPT

## 2024-02-02 PROCEDURE — 83036 HEMOGLOBIN GLYCOSYLATED A1C: CPT

## 2024-02-02 PROCEDURE — 85025 COMPLETE CBC W/AUTO DIFF WBC: CPT

## 2024-02-02 PROCEDURE — 84443 ASSAY THYROID STIM HORMONE: CPT

## 2024-02-02 PROCEDURE — 84145 PROCALCITONIN (PCT): CPT

## 2024-02-02 RX ORDER — THIAMINE MONONITRATE (VIT B1) 100 MG
500 TABLET ORAL EVERY 8 HOURS
Refills: 0 | Status: DISCONTINUED | OUTPATIENT
Start: 2024-02-02 | End: 2024-02-05

## 2024-02-02 RX ORDER — NALOXONE HYDROCHLORIDE 4 MG/.1ML
0.4 SPRAY NASAL ONCE
Refills: 0 | Status: COMPLETED | OUTPATIENT
Start: 2024-02-02 | End: 2024-02-02

## 2024-02-02 RX ORDER — NALOXONE HYDROCHLORIDE 4 MG/.1ML
0.25 SPRAY NASAL
Qty: 2 | Refills: 0 | Status: DISCONTINUED | OUTPATIENT
Start: 2024-02-02 | End: 2024-02-02

## 2024-02-02 RX ORDER — SODIUM CHLORIDE 9 MG/ML
500 INJECTION, SOLUTION INTRAVENOUS
Refills: 0 | Status: DISCONTINUED | OUTPATIENT
Start: 2024-02-02 | End: 2024-02-02

## 2024-02-02 RX ORDER — ENOXAPARIN SODIUM 100 MG/ML
40 INJECTION SUBCUTANEOUS EVERY 24 HOURS
Refills: 0 | Status: DISCONTINUED | OUTPATIENT
Start: 2024-02-02 | End: 2024-02-05

## 2024-02-02 RX ORDER — PANTOPRAZOLE SODIUM 20 MG/1
40 TABLET, DELAYED RELEASE ORAL
Refills: 0 | Status: DISCONTINUED | OUTPATIENT
Start: 2024-02-02 | End: 2024-02-05

## 2024-02-02 RX ORDER — MAGNESIUM SULFATE 500 MG/ML
2 VIAL (ML) INJECTION ONCE
Refills: 0 | Status: COMPLETED | OUTPATIENT
Start: 2024-02-02 | End: 2024-02-02

## 2024-02-02 RX ORDER — FOLIC ACID 0.8 MG
1 TABLET ORAL DAILY
Refills: 0 | Status: DISCONTINUED | OUTPATIENT
Start: 2024-02-02 | End: 2024-02-05

## 2024-02-02 RX ORDER — ONDANSETRON 8 MG/1
4 TABLET, FILM COATED ORAL ONCE
Refills: 0 | Status: COMPLETED | OUTPATIENT
Start: 2024-02-02 | End: 2024-02-02

## 2024-02-02 RX ORDER — NALOXONE HYDROCHLORIDE 4 MG/.1ML
0.04 SPRAY NASAL
Qty: 2 | Refills: 0 | Status: DISCONTINUED | OUTPATIENT
Start: 2024-02-02 | End: 2024-02-02

## 2024-02-02 RX ADMIN — Medication 100 MILLIGRAM(S): at 05:31

## 2024-02-02 RX ADMIN — Medication 105 MILLIGRAM(S): at 21:07

## 2024-02-02 RX ADMIN — Medication 105 MILLIGRAM(S): at 18:05

## 2024-02-02 RX ADMIN — Medication 100 MILLIGRAM(S): at 18:07

## 2024-02-02 RX ADMIN — Medication 25 GRAM(S): at 10:23

## 2024-02-02 RX ADMIN — PANTOPRAZOLE SODIUM 40 MILLIGRAM(S): 20 TABLET, DELAYED RELEASE ORAL at 06:14

## 2024-02-02 RX ADMIN — NALOXONE HYDROCHLORIDE 0.4 MILLIGRAM(S): 4 SPRAY NASAL at 01:38

## 2024-02-02 RX ADMIN — NALOXONE HYDROCHLORIDE 62.5 MG/HR: 4 SPRAY NASAL at 05:30

## 2024-02-02 RX ADMIN — Medication 1 MILLIGRAM(S): at 14:11

## 2024-02-02 RX ADMIN — ONDANSETRON 4 MILLIGRAM(S): 8 TABLET, FILM COATED ORAL at 10:06

## 2024-02-02 RX ADMIN — Medication 105 MILLIGRAM(S): at 05:30

## 2024-02-02 NOTE — H&P ADULT - HISTORY OF PRESENT ILLNESS
Case of 43-year-old female with PMHx of cocaine and alcohol abuse & active smoker, BIBEMS after being found unresponsive on the ground in parking lot behind a liquor store.     walked away from patient for a few minutes and came back to finding patient on the ground. Both  and wife had smoker crack but  is not sure whether it was laced. EMS arrived and gave 2 doses of IM narcan (2mg each), patient became more awake, last one given 15min prior to arrival to ED.    Of note, patient drank vodka earlier today, patient drinks at least 1 pint of alcohol daily.     Patient currently complaining of nausea and vomiting. was vomiting in ED on arrival. Also mentions that she feels hot, sweaty, and mouth feeling dry.  No other complaints were reported.    In the ED, vitals were   · /110 mm Hg  · Heart Rate 88 /min  · Respiration Rate (breaths/min) 18  · Temp (F)98.3 Degrees F  · SpO2 (%)94 %    Labs remarkable for WBC 25k, , ALS:AST 2:1, VBG showing acidosis (lactate elevated then normalized)    CTH: No evidence of acute intracranial pathology.    CT AP with IVC: 1.  No evidence of acute abdominopelvic pathology. 2.  Right gluteal nonspecific fluid collection measuring 4 x 2 x 3.5 cm with mild surrounding stranding (series 3 image 122), possibly related to  injection or procedural changes. 3.  Patchy opacities in the right middle and left lower lobes possibly  infectious or inflammatory. Given history of vomiting, aspiration cannot   be excluded .4.  Small hiatal hernia with mild thickening of the distal esophagus possibly related to chronic reflux disease.    In the ED, given 2L LR and started on narcan drip

## 2024-02-02 NOTE — H&P ADULT - ASSESSMENT
43-year-old female with PMHx of cocaine and alcohol abuse, BIBEMS after being found unresponsive on the ground in parking lot behind a liquor store.      IMPRESSION:    #Acute hypoxic hypercapnic respiratory failure due to drug overdose/abuse (with possible aspiration pneumonia)  #Toxic metabolic encephalopathy  #Possible aspiration pneumonia  #Alcoholic liver disease with active alcohol abuse  #Respiratory acidosis  #Leukocytosis        PLAN:    CNS: Avoid CNS Depressants. Continue narcan drip  for 12 or 24 hours. Drug and serum tox. Thiamine IV. Folate. Addiction medicine eval. CIWA protocol symptom triggered    HEENT: Aspiration precautions     PULMONARY: HOB @ 45. Monitor Pulse Ox. Keep > 93%. Supplement as needed.    CARDIOVASCULAR:   - Daily Weight. Strict I&Os. Keep I = O  - BNP  - IVF for now    GI: GI prophylaxis. Feeding when respiratory status is stable. Needs GI eval as OP for mild thickening of the distal esophagus seen on CT.    RENAL: Avoid nephrotoxic agents. Get ABG. Add BHB (r/o alcoholic or starvation ketoacidosis )    INFECTIOUS DISEASE: Treat for aspiration pneumonia: Will cover with levaquin and doxy. MRSA swab. Blood Cx. Procal. UA.    HEMATOLOGICAL: DVT prophylaxis. Dimer    ENDOCRINE: Monitor FS    MUSCULOSKELETAL: Ambulate as tolerated     DISPOSITION: MICU     43-year-old female with PMHx of cocaine and alcohol abuse, BIBEMS after being found unresponsive on the ground in parking lot behind a liquor store.      IMPRESSION:    #Acute hypoxic hypercapnic respiratory failure due to drug overdose/abuse (with possible aspiration pneumonia)  #Toxic metabolic encephalopathy  #Possible aspiration pneumonia  #Alcoholic liver disease with active alcohol abuse  #Gluteal collection (not read as abscess on CT)  #Respiratory acidosis  #Leukocytosis        PLAN:    CNS: Avoid CNS Depressants. Continue narcan drip  for 12 or 24 hours. Drug and serum tox. Thiamine IV. Folate. Addiction medicine eval. CIWA protocol symptom triggered    HEENT: Aspiration precautions     PULMONARY: HOB @ 45. Monitor Pulse Ox. Keep > 93%. Supplement as needed.    CARDIOVASCULAR:   - Daily Weight. Strict I&Os. Keep I = O  - BNP  - IVF for now    GI: GI prophylaxis. Feeding when respiratory status is stable. Needs GI eval as OP for mild thickening of the distal esophagus seen on CT.    RENAL: Avoid nephrotoxic agents. Get ABG. Add BHB (r/o alcoholic or starvation ketoacidosis )    INFECTIOUS DISEASE: Treat for aspiration pneumonia: Will cover with levaquin and doxy. MRSA swab. Blood Cx. Procal. UA.    HEMATOLOGICAL: DVT prophylaxis. Dimer    ENDOCRINE: Monitor FS    MUSCULOSKELETAL: Ambulate as tolerated     DISPOSITION: MICU     43-year-old female with PMHx of cocaine and alcohol abuse, BIBEMS after being found unresponsive on the ground in parking lot behind a liquor store.      IMPRESSION:    #Acute hypoxic hypercapnic respiratory failure due to drug overdose/abuse (with possible aspiration pneumonia)  #Toxic metabolic encephalopathy  #Possible aspiration pneumonia  #Alcoholic liver disease with active alcohol abuse  #Gluteal collection (not read as abscess on CT)  #Respiratory acidosis  #Leukocytosis        PLAN:    CNS: Continue narcan drip  for 12 or 24 hours. Drug and serum tox. Thiamine IV. Folate. CIWA protocol symptom triggered. Addiction medicine eval.     HEENT: Aspiration precautions     PULMONARY: HOB @ 45. Monitor Pulse Ox. Keep > 93%. Supplement as needed.    CARDIOVASCULAR:   - Daily Weight. Strict I&Os. Keep I = O  - BNP  - IVF for now    GI: GI prophylaxis. Feeding when respiratory status is stable. Needs GI eval as OP for mild thickening of the distal esophagus seen on CT.    RENAL: Avoid nephrotoxic agents. Get ABG. Add BHB (r/o alcoholic or starvation ketoacidosis )    INFECTIOUS DISEASE: Treat for aspiration pneumonia: Will cover with levaquin and doxy. MRSA swab. Blood Cx. Procal. UA. RVP.    HEMATOLOGICAL: DVT prophylaxis. Dimer    ENDOCRINE: Monitor FS    MUSCULOSKELETAL: Ambulate as tolerated     DISPOSITION: MICU     43-year-old female with PMHx of cocaine and alcohol abuse, BIBEMS after being found unresponsive on the ground in parking lot behind a liquor store.      IMPRESSION:    #Acute hypoxic hypercapnic respiratory failure due to drug overdose/abuse (with possible aspiration pneumonia)- resolved  #Toxic metabolic encephalopathy- resolved  #Possible aspiration pneumonia  #Alcoholic liver disease with active alcohol abuse  #Gluteal collection (not read as abscess on CT)  #Respiratory acidosis  #Leukocytosis        PLAN:    CNS: Hold narcan drip  and apply end tidal CO2 for 4 hours monitoring. Drug and serum tox. Thiamine IV. Folate. CIWA protocol symptom triggered. Addiction medicine eval.     HEENT: Aspiration precautions     PULMONARY: HOB @ 45. Monitor Pulse Ox. Keep > 93%. Supplement as needed.    CARDIOVASCULAR:   - Daily Weight. Strict I&Os. Keep I = O  - BNP  - IVF for now    GI: GI prophylaxis. Feeding. Needs GI eval as OP for mild thickening of the distal esophagus seen on CT.    RENAL: Avoid nephrotoxic agents. Get ABG. Add BHB (r/o alcoholic or starvation ketoacidosis )    INFECTIOUS DISEASE: Treat for aspiration pneumonia: Will cover with levaquin and doxy. MRSA swab. Blood Cx. Procal. UA. RVP.    HEMATOLOGICAL: DVT prophylaxis. Dimer    ENDOCRINE: Monitor FS    MUSCULOSKELETAL: Ambulate as tolerated     DISPOSITION: MICU, possible downgrade to SDU in afternoon

## 2024-02-02 NOTE — CONSULT NOTE ADULT - ASSESSMENT
Assessment:  Concern for opioid toxicity (unclear if pt is chronic opioid user or if pt had cocaine laced with opioid adulterant) given response to naloxone and respiratory acidosis on bloodwork. Pt with recrudescence while being monitored in the ED and had good response to 0.4mg IV naloxone though pt still hypercapnic on repeat blood gas.     Concern for post naloxone noncardiogenic pulmonary edema versus aspiration pneumonitis. Difficult to completely ascertain, CT abd showing bilateral infiltrates.    Some concern for malnutrition/alcoholism, risk of developing wernickes. Pts LFT’s consistent with alcoholism. Pts initial lactate could be from a period of hypoxia or from alcohol’s effects on metabolism.    Recommendations:  1. Admit the patient to the ICU. Start the patient on a naloxone infusion (seems pt with a good response to 0.4mg IV naloxone, can start infusion at 2/3 this dose per hour and titrate to a RR >12). Continue the infusion for 12 or 24 hours. Note that once infusion turned off, pt should be monitored for 6 hrs in the ICU to ensure pt doesn’t recrudesce. Would monitor pt on ETCO2 while on naloxone infusion.   2. Closely monitor pts respiratory status (can do repeat CXR and/or serial lung US) to try to determine of pt is developing noncardiogenic pulmonary edema (vs aspiration pneumonitis). If the pt is developing noncardiogenic pulmonary edema, would attempt non invasive positive pressure ventilation (NIPPV/Bipap) if pts mental status can tolerate it (naloxone infusion should help with this). It’s important to note that if pt is developing noncardiogenic pulmonary edema, the naloxone infusion will be important in treating hypercapnia so the pt can be monitored for development of ARDS without opioid toxicity confounding pts respiratory status.  3. Give 500mg thiamine IV, TID for 3 days.   Assessment:  Concern for opioid toxicity (unclear if pt is chronic opioid user or if pt had cocaine laced with opioid adulterant) given response to naloxone and respiratory acidosis on bloodwork. Pt with recrudescence while being monitored in the ED and had good response to 0.4mg IV naloxone though pt still hypercapnic on repeat blood gas.     Concern for post naloxone noncardiogenic pulmonary edema versus aspiration pneumonitis. Difficult to completely ascertain, CT abd showing bilateral infiltrates.    Some concern for malnutrition/alcoholism, risk of developing wernickes. Pts LFT’s consistent with alcoholism. Pts initial lactate could be from a period of hypoxia or from alcohol’s effects on metabolism.    Recommendations:  1. Admit the patient to the ICU. Start the patient on a naloxone infusion (seems pt with a good response to 0.4mg IV naloxone, can start infusion at 2/3 this dose per hour and titrate to a RR >12). Continue the infusion for 12 or 24 hours. Note that once infusion turned off, pt should be monitored for 6 hrs in the ICU to ensure pt doesn’t recrudesce. Would monitor pt on ETCO2 while on naloxone infusion.   2. Closely monitor pts respiratory status (can do repeat CXR and/or serial lung US) to try to determine of pt is developing noncardiogenic pulmonary edema (vs aspiration pneumonitis). If the pt is developing noncardiogenic pulmonary edema, would attempt non invasive positive pressure ventilation (NIPPV/Bipap) if pts mental status can tolerate it (naloxone infusion should help with this). It’s important to note that if pt is developing noncardiogenic pulmonary edema, the naloxone infusion will be important in treating hypercapnia so the pt can be monitored for development of ARDS without opioid toxicity confounding pts respiratory status.  3. Give 500mg thiamine IV, TID for 3 days.    I personally discussed with ED team. I reviewed the med tox fellow’s note (as assigned above), and agree with the findings and plan except as documented in my note.  --Will continue to follow. Please call with any further questions    Ross    [phone] (pager)

## 2024-02-02 NOTE — H&P ADULT - NSHPLABSRESULTS_GEN_ALL_CORE
VITAL SIGNS: Last 24 Hours  T(C): 36.8 (01 Feb 2024 20:04), Max: 36.8 (01 Feb 2024 20:04)  T(F): 98.3 (01 Feb 2024 20:04), Max: 98.3 (01 Feb 2024 20:04)  HR: 78 (02 Feb 2024 01:31) (18 - 88)  BP: 123/78 (02 Feb 2024 01:31) (123/78 - 175/110)  BP(mean): --  RR: 16 (02 Feb 2024 01:31) (16 - 88)  SpO2: 94% (01 Feb 2024 20:04) (94% - 94%)    LABS:                        14.4   25.71 )-----------( 297      ( 01 Feb 2024 20:31 )             45.3     02-01    142  |  107  |  12  ----------------------------<  188<H>  4.7   |  18  |  0.9    Ca    9.2      01 Feb 2024 20:31  Mg     2.0     02-01    TPro  7.2  /  Alb  4.7  /  TBili  0.4  /  DBili  x   /  AST  179<H>  /  ALT  73<H>  /  AlkPhos  119<H>  02-01      Urinalysis Basic - ( 01 Feb 2024 20:31 )    Color: x / Appearance: x / SG: x / pH: x  Gluc: 188 mg/dL / Ketone: x  / Bili: x / Urobili: x   Blood: x / Protein: x / Nitrite: x   Leuk Esterase: x / RBC: x / WBC x   Sq Epi: x / Non Sq Epi: x / Bacteria: x                RADIOLOGY:

## 2024-02-02 NOTE — H&P ADULT - ATTENDING COMMENTS
events noted, presented with suspected overdose, better with narcan, possible aspiration/ acute hypercap rsp failure, this am awake on narcan drip want to drink/ eat, sp tox eval, dc narcan, ETCO2 start feeding, ABX, SDU

## 2024-02-02 NOTE — ED ADULT NURSE REASSESSMENT NOTE - NS ED NURSE REASSESS COMMENT FT1
Pt received from previous RN, alert & oriented x4. Narcan drip not infusing as per previous RN was instructed to turn off. Pt able to stay awake, able to speak in full sentences.

## 2024-02-02 NOTE — H&P ADULT - NSHPPHYSICALEXAM_GEN_ALL_CORE
PHYSICAL EXAM:  CONSTITUTIONAL: moderate distress, AAOx3  PULMONARY: decreased air entry bilaterally  CARDIOVASCULAR: Regular rate and rhythm;   GASTROINTESTINAL: Soft, non-tender, non-distended; bowel sounds present  MUSCULOSKELETAL: 2+ peripheral pulses; no LLE

## 2024-02-02 NOTE — CONSULT NOTE ADULT - SUBJECTIVE AND OBJECTIVE BOX
MEDICAL TOXICOLOGY CONSULT    HPI:  43y female known substance abuse, reportedly was found unresponsive in a parking lot at about 7:40pm this evening. Reportedly pt was drinking alcohol and pt was doing cocaine. EMS gave 2mg IN naloxone twice prior to arrival to the ED (estimated time of IN naloxone was 7:45pm) with good response though with some nausea/vomiting. Pt denies history of opioid use. Denies any other ingestants this evening.     Presenting vitals (after IN naloxone): HR 88, /110, RR 18, sat 94% on 3L NC (low 90s on RA)  EKG: QRS 78, QTc 455  Exam (after IN naloxone): awake and alert, pupils mid range and reactive bilaterally, good respiratory drive, no diaphoresis/flushed skin/tremors/clonus  Labs: alcohol 14, apap negative, salicylate 0.8, pH 7.08/pCO2 66/ bicarb 18/ lactate 6.1, kidney function reassuring, AST//73  CT abd/pelv: infiltrates to bilateral lower lobes  CTH: negative for ICH  Initial CXR: without infiltrates  In ED: Pt monitored and about 5 hrs after pt received IN naloxone pt became apnic/cyanotic, pt got a dose of .4mg IV naloxone at 12:45am with improvement of mental status/RR. Repeat blood gas shows pH 7.18/ pCO2 62,     Toxicology consulted for opioid toxicity/ post naloxone hypoxia    PAST MEDICAL & SURGICAL HISTORY:      MEDICATION HISTORY:  naloxone Infusion 0.25 mG/Hr IV Continuous <Continuous>      FAMILY HISTORY:      REVIEW OF SYSTEMS:   _____unable to perform due to intoxication, dementia, or illness      Vital Signs Last 24 Hrs  T(C): 36.8 (01 Feb 2024 20:04), Max: 36.8 (01 Feb 2024 20:04)  T(F): 98.3 (01 Feb 2024 20:04), Max: 98.3 (01 Feb 2024 20:04)  HR: 78 (02 Feb 2024 01:31) (18 - 88)  BP: 123/78 (02 Feb 2024 01:31) (123/78 - 175/110)  BP(mean): --  RR: 16 (02 Feb 2024 01:31) (16 - 88)  SpO2: 94% (01 Feb 2024 20:04) (94% - 94%)    Parameters below as of 01 Feb 2024 20:04  Patient On (Oxygen Delivery Method): room air        SIGNIFICANT LABORATORY STUDIES:                        14.4   25.71 )-----------( 297      ( 01 Feb 2024 20:31 )             45.3       02-01    142  |  107  |  12  ----------------------------<  188<H>  4.7   |  18  |  0.9    Ca    9.2      01 Feb 2024 20:31  Mg     2.0     02-01    TPro  7.2  /  Alb  4.7  /  TBili  0.4  /  DBili  x   /  AST  179<H>  /  ALT  73<H>  /  AlkPhos  119<H>  02-01          Urinalysis Basic - ( 01 Feb 2024 20:31 )    Color: x / Appearance: x / SG: x / pH: x  Gluc: 188 mg/dL / Ketone: x  / Bili: x / Urobili: x   Blood: x / Protein: x / Nitrite: x   Leuk Esterase: x / RBC: x / WBC x   Sq Epi: x / Non Sq Epi: x / Bacteria: x        Anion Gap: 17<H> 02-01 @ 20:31  CK: -- 02-01 @ 20:31  Troponin:  --  02-01 @ 20:31  Pro-BNP:  --  02-01 @ 20:31  VBG:  --  02-01 @ 20:31  Carboxyhemoglobin %:  --  02-01 @ 20:31  Methemoglobin %:  --  02-01 @ 20:31  Osmolality Serum:  --  02-01 @ 20:31  Aspirin Level: 0.8<L>  02-01 @ 20:31  Acetaminophen Level:  <5.0<L>  02-01 @ 20:31  Ethanol Level:  --  02-01 @ 20:31  Digoxin Level:  --  02-01 @ 20:31  Phenytoin Level:  --  02-01 @ 20:31  Carbamazepine level:  --  02-01 @ 20:31  Lamotrigine level:  --  02-01 @ 20:31

## 2024-02-02 NOTE — CHART NOTE - NSCHARTNOTEFT_GEN_A_CORE
MICU Transfer Note    Transfer from: MICU (from ED)    Transfer to: (  ) Medicine    (  ) Telemetry    (  ) RCU                               (  ) Palliative    (  ) Stroke Unit    (  ) MICU    ( x ) ______SDU____________    Accepting Physician:    Signout given to:     HPI / CCU COURSE:    Case of 43-year-old female with PMHx of cocaine and alcohol abuse & active smoker, BIBEMS after being found unresponsive on the ground in parking lot behind a liquor store.     walked away from patient for a few minutes and came back to finding patient on the ground. Both  and wife had smoker crack but  is not sure whether it was laced. EMS arrived and gave 2 doses of IM narcan (2mg each), patient became more awake, last one given 15min prior to arrival to ED.    Of note, patient drank vodka earlier today, patient drinks at least 1 pint of alcohol daily.     Patient currently complaining of nausea and vomiting. was vomiting in ED on arrival. Also mentions that she feels hot, sweaty, and mouth feeling dry.  No other complaints were reported.    In the ED, vitals were   · /110 mm Hg  · Heart Rate 88 /min  · Respiration Rate (breaths/min) 18  · Temp (F)98.3 Degrees F  · SpO2 (%)94 %    Labs remarkable for WBC 25k, , ALS:AST 2:1, VBG showing acidosis (lactate elevated then normalized)    CTH: No evidence of acute intracranial pathology.    CT AP with IVC: 1.  No evidence of acute abdominopelvic pathology. 2.  Right gluteal nonspecific fluid collection measuring 4 x 2 x 3.5 cm with mild surrounding stranding (series 3 image 122), possibly related to  injection or procedural changes. 3.  Patchy opacities in the right middle and left lower lobes possibly  infectious or inflammatory. Given history of vomiting, aspiration cannot   be excluded .4.  Small hiatal hernia with mild thickening of the distal esophagus possibly related to chronic reflux disease.    In the ED, given 2L LR and started on narcan drip    Patient admitted under MICU level care.  Narcan drip shut off last night. No withdrawal symptoms. VS stable. BHB (-) Downgraded to SDU      Vital Signs Last 24 Hrs  T(C): 36.5 (02 Feb 2024 08:49), Max: 36.8 (01 Feb 2024 20:04)  T(F): 97.7 (02 Feb 2024 08:49), Max: 98.3 (01 Feb 2024 20:04)  HR: 62 (02 Feb 2024 08:49) (18 - 88)  BP: 129/62 (02 Feb 2024 08:49) (116/72 - 175/110)  BP(mean): --  RR: 18 (02 Feb 2024 08:49) (16 - 88)  SpO2: 97% (02 Feb 2024 08:49) (94% - 98%)    Parameters below as of 02 Feb 2024 08:49  Patient On (Oxygen Delivery Method): room air        I&O's Summary      Physical Exam:   PHYSICAL EXAM:  GENERAL: NAD, lying in bed comfortably  HEAD:  Atraumatic, Normocephalic  EYES: EOMI, PERRLA, conjunctiva and sclera clear  ENT: Moist mucous membranes  NECK: Supple, No JVD  CHEST/LUNG: Clear to auscultation bilaterally; No rales, rhonchi, wheezing, or rubs. Unlabored respirations  HEART: Regular rate and rhythm; No murmurs, rubs, or gallops  ABDOMEN: Bowel sounds present; Soft, Nontender, Nondistended. No hepatomegaly  EXTREMITIES:  2+ Peripheral Pulses, brisk capillary refill. No clubbing, cyanosis, or edema  NERVOUS SYSTEM:  Alert & Oriented X3, speech clear. No deficits   MSK: FROM all 4 extremities, full and equal strength  SKIN: No rashes or lesions    LABS:                               13.9   20.50 )-----------( 266      ( 02 Feb 2024 05:00 )             43.4       02-02    139  |  104  |  15  ----------------------------<  97  4.9   |  23  |  0.7    Ca    9.2      02 Feb 2024 05:00  Phos  4.9     02-02  Mg     1.7     02-02    TPro  7.1  /  Alb  4.7  /  TBili  0.3  /  DBili  x   /  AST  80<H>  /  ALT  62<H>  /  AlkPhos  94  02-02                ECG:    Telemetry:    Imaging:      ASSESSMENT & PLAN:     43-year-old female with PMHx of cocaine and alcohol abuse & active smoker, BIBEMS after being found unresponsive on the ground in parking lot behind a liquor store. Responded with narcan drip    #Acute hypoxic hypercapnic respiratory failure due to drug overdose/abuse (with possible aspiration pneumonia)- resolved  #Toxic metabolic encephalopathy- resolved  #Possible aspiration pneumonia  #Alcoholic liver disease with active alcohol abuse  #Gluteal collection (not read as abscess on CT)  #Respiratory acidosis  #Leukocytosis    #Toxic metabolic encephalopathy  #Polysubstance abuse with alcohol and cocaine, opioid  #Gluteal  fluid collection possibly from drug injections?  - alcohol level - 14, tylenol (-), salicylate level (-)  - responded to nalxone. started on narcan drip per toxicology. now off narcan drip.   - follow up drug screen  - end tidal CO2 monitoring  - CIWA symptom triggered started. last alcohol use yesterday  - thiamine and folate  - CT scan noted. suspect she is injecting this location  - zofran PRN for nausea    #Acute respiratory hypercapnic respiratory failure- improved  #Lactic acidosis - improved  - s/p 2L bolus/ started on IVF. will d/c    #Aspiration pneumonia likely 2/2 MTE  #Leukocytosis- downtrending  - started on dxy and levofloxacin. continue for 2 more days. reassess later    #DVT ppx: lovenox  #GI ppx: none  #Diet: regular  #Activity: AAT  #COde: full  #Dispo: acute, addition following, catch following    FOR FOLLOW UP:  [ ] f/u addiction  [ ]   [ ]     Juan Luis Foley MD PGY3 MICU Transfer Note    Transfer from: MICU (from ED)    Transfer to: (  ) Medicine    (  ) Telemetry    (  ) RCU                               (  ) Palliative    (  ) Stroke Unit    (  ) MICU    ( x ) ______SDU____________    Accepting Physician:    Signout given to:     HPI / CCU COURSE:    Case of 43-year-old female with PMHx of cocaine and alcohol abuse & active smoker, BIBEMS after being found unresponsive on the ground in parking lot behind a liquor store.     walked away from patient for a few minutes and came back to finding patient on the ground. Both  and wife had smoker crack but  is not sure whether it was laced. EMS arrived and gave 2 doses of IM narcan (2mg each), patient became more awake, last one given 15min prior to arrival to ED.    Of note, patient drank vodka earlier today, patient drinks at least 1 pint of alcohol daily.     Patient currently complaining of nausea and vomiting. was vomiting in ED on arrival. Also mentions that she feels hot, sweaty, and mouth feeling dry.  No other complaints were reported.    In the ED, vitals were   · /110 mm Hg  · Heart Rate 88 /min  · Respiration Rate (breaths/min) 18  · Temp (F)98.3 Degrees F  · SpO2 (%)94 %    Labs remarkable for WBC 25k, , ALS:AST 2:1, VBG showing acidosis (lactate elevated then normalized)    CTH: No evidence of acute intracranial pathology.    CT AP with IVC: 1.  No evidence of acute abdominopelvic pathology. 2.  Right gluteal nonspecific fluid collection measuring 4 x 2 x 3.5 cm with mild surrounding stranding (series 3 image 122), possibly related to  injection or procedural changes. 3.  Patchy opacities in the right middle and left lower lobes possibly  infectious or inflammatory. Given history of vomiting, aspiration cannot   be excluded .4.  Small hiatal hernia with mild thickening of the distal esophagus possibly related to chronic reflux disease.    In the ED, given 2L LR and started on narcan drip    Patient admitted under MICU level care.  Narcan drip shut off last night. No withdrawal symptoms. VS stable. BHB (-) Downgraded to SDU      Vital Signs Last 24 Hrs  T(C): 36.5 (02 Feb 2024 08:49), Max: 36.8 (01 Feb 2024 20:04)  T(F): 97.7 (02 Feb 2024 08:49), Max: 98.3 (01 Feb 2024 20:04)  HR: 62 (02 Feb 2024 08:49) (18 - 88)  BP: 129/62 (02 Feb 2024 08:49) (116/72 - 175/110)  BP(mean): --  RR: 18 (02 Feb 2024 08:49) (16 - 88)  SpO2: 97% (02 Feb 2024 08:49) (94% - 98%)    Parameters below as of 02 Feb 2024 08:49  Patient On (Oxygen Delivery Method): room air        I&O's Summary      Physical Exam:   PHYSICAL EXAM:  GENERAL: NAD, lying in bed comfortably  HEAD:  Atraumatic, Normocephalic  EYES: EOMI, PERRLA, conjunctiva and sclera clear  ENT: Moist mucous membranes  NECK: Supple, No JVD  CHEST/LUNG: Clear to auscultation bilaterally; No rales, rhonchi, wheezing, or rubs. Unlabored respirations  HEART: Regular rate and rhythm; No murmurs, rubs, or gallops  ABDOMEN: Bowel sounds present; Soft, Nontender, Nondistended. No hepatomegaly  EXTREMITIES:  2+ Peripheral Pulses, brisk capillary refill. No clubbing, cyanosis, or edema  NERVOUS SYSTEM:  Alert & Oriented X3, speech clear. No deficits   MSK: FROM all 4 extremities, full and equal strength  SKIN: No rashes or lesions    LABS:                               13.9   20.50 )-----------( 266      ( 02 Feb 2024 05:00 )             43.4       02-02    139  |  104  |  15  ----------------------------<  97  4.9   |  23  |  0.7    Ca    9.2      02 Feb 2024 05:00  Phos  4.9     02-02  Mg     1.7     02-02    TPro  7.1  /  Alb  4.7  /  TBili  0.3  /  DBili  x   /  AST  80<H>  /  ALT  62<H>  /  AlkPhos  94  02-02                ECG:    Telemetry:    Imaging:      ASSESSMENT & PLAN:     43-year-old female with PMHx of cocaine and alcohol abuse & active smoker, BIBEMS after being found unresponsive on the ground in parking lot behind a liquor store. Responded with narcan drip    #Acute hypoxic hypercapnic respiratory failure due to drug overdose/abuse (with possible aspiration pneumonia)- resolved  #Toxic metabolic encephalopathy- resolved  #Possible aspiration pneumonia  #Alcoholic liver disease with active alcohol abuse  #Gluteal collection (not read as abscess on CT)  #Respiratory acidosis  #Leukocytosis    #Toxic metabolic encephalopathy  #Polysubstance abuse with alcohol and cocaine, opioid  #Gluteal  fluid collection possibly from drug injections?  - alcohol level - 14, tylenol (-), salicylate level (-)  - responded to nalxone. started on narcan drip per toxicology. now off narcan drip.   - follow up drug screen  - end tidal CO2 monitoring. last one 37  - CIWA symptom triggered started. last alcohol use yesterday  - thiamine and folate  - CT scan noted. suspect she is injecting this location  - zofran PRN for nausea    #Acute respiratory hypercapnic respiratory failure- improved  #Lactic acidosis - improved  - s/p 2L bolus/ started on IVF. will d/c    #Aspiration pneumonia likely 2/2 MTE  #Leukocytosis- downtrending  - started on dxy and levofloxacin. continue for 2 more days. reassess later    #DVT ppx: lovenox  #GI ppx: none  #Diet: regular  #Activity: AAT  #COde: full  #Dispo: acute, addition following, catch following    FOR FOLLOW UP:  [ ] f/u addiction  [ ]   [ ]     Juan Luis Foley MD PGY3

## 2024-02-03 DIAGNOSIS — R41.89 OTHER SYMPTOMS AND SIGNS INVOLVING COGNITIVE FUNCTIONS AND AWARENESS: ICD-10-CM

## 2024-02-03 DIAGNOSIS — F10.10 ALCOHOL ABUSE, UNCOMPLICATED: ICD-10-CM

## 2024-02-03 DIAGNOSIS — Z79.899 OTHER LONG TERM (CURRENT) DRUG THERAPY: ICD-10-CM

## 2024-02-03 LAB
ALBUMIN SERPL ELPH-MCNC: 3.8 G/DL — SIGNIFICANT CHANGE UP (ref 3.5–5.2)
ALP SERPL-CCNC: 74 U/L — SIGNIFICANT CHANGE UP (ref 30–115)
ALT FLD-CCNC: 33 U/L — SIGNIFICANT CHANGE UP (ref 0–41)
ANION GAP SERPL CALC-SCNC: 11 MMOL/L — SIGNIFICANT CHANGE UP (ref 7–14)
AST SERPL-CCNC: 30 U/L — SIGNIFICANT CHANGE UP (ref 0–41)
BASOPHILS # BLD AUTO: 0.03 K/UL — SIGNIFICANT CHANGE UP (ref 0–0.2)
BASOPHILS NFR BLD AUTO: 0.3 % — SIGNIFICANT CHANGE UP (ref 0–1)
BILIRUB SERPL-MCNC: 0.4 MG/DL — SIGNIFICANT CHANGE UP (ref 0.2–1.2)
BUN SERPL-MCNC: 9 MG/DL — LOW (ref 10–20)
CALCIUM SERPL-MCNC: 8.7 MG/DL — SIGNIFICANT CHANGE UP (ref 8.4–10.5)
CHLORIDE SERPL-SCNC: 101 MMOL/L — SIGNIFICANT CHANGE UP (ref 98–110)
CO2 SERPL-SCNC: 26 MMOL/L — SIGNIFICANT CHANGE UP (ref 17–32)
CREAT SERPL-MCNC: 0.6 MG/DL — LOW (ref 0.7–1.5)
EGFR: 116 ML/MIN/1.73M2 — SIGNIFICANT CHANGE UP
EOSINOPHIL # BLD AUTO: 0.11 K/UL — SIGNIFICANT CHANGE UP (ref 0–0.7)
EOSINOPHIL NFR BLD AUTO: 1 % — SIGNIFICANT CHANGE UP (ref 0–8)
GLUCOSE SERPL-MCNC: 82 MG/DL — SIGNIFICANT CHANGE UP (ref 70–99)
HCT VFR BLD CALC: 36.3 % — LOW (ref 37–47)
HGB BLD-MCNC: 11.9 G/DL — LOW (ref 12–16)
IMM GRANULOCYTES NFR BLD AUTO: 0.3 % — SIGNIFICANT CHANGE UP (ref 0.1–0.3)
LYMPHOCYTES # BLD AUTO: 2.76 K/UL — SIGNIFICANT CHANGE UP (ref 1.2–3.4)
LYMPHOCYTES # BLD AUTO: 24.1 % — SIGNIFICANT CHANGE UP (ref 20.5–51.1)
MAGNESIUM SERPL-MCNC: 1.7 MG/DL — LOW (ref 1.8–2.4)
MCHC RBC-ENTMCNC: 30.5 PG — SIGNIFICANT CHANGE UP (ref 27–31)
MCHC RBC-ENTMCNC: 32.8 G/DL — SIGNIFICANT CHANGE UP (ref 32–37)
MCV RBC AUTO: 93.1 FL — SIGNIFICANT CHANGE UP (ref 81–99)
MONOCYTES # BLD AUTO: 0.95 K/UL — HIGH (ref 0.1–0.6)
MONOCYTES NFR BLD AUTO: 8.3 % — SIGNIFICANT CHANGE UP (ref 1.7–9.3)
NEUTROPHILS # BLD AUTO: 7.55 K/UL — HIGH (ref 1.4–6.5)
NEUTROPHILS NFR BLD AUTO: 66 % — SIGNIFICANT CHANGE UP (ref 42.2–75.2)
NRBC # BLD: 0 /100 WBCS — SIGNIFICANT CHANGE UP (ref 0–0)
PLATELET # BLD AUTO: 208 K/UL — SIGNIFICANT CHANGE UP (ref 130–400)
PMV BLD: 11.7 FL — HIGH (ref 7.4–10.4)
POTASSIUM SERPL-MCNC: 4 MMOL/L — SIGNIFICANT CHANGE UP (ref 3.5–5)
POTASSIUM SERPL-SCNC: 4 MMOL/L — SIGNIFICANT CHANGE UP (ref 3.5–5)
PROCALCITONIN SERPL-MCNC: 0.71 NG/ML — HIGH (ref 0.02–0.1)
PROT SERPL-MCNC: 5.7 G/DL — LOW (ref 6–8)
RBC # BLD: 3.9 M/UL — LOW (ref 4.2–5.4)
RBC # FLD: 12.7 % — SIGNIFICANT CHANGE UP (ref 11.5–14.5)
SODIUM SERPL-SCNC: 138 MMOL/L — SIGNIFICANT CHANGE UP (ref 135–146)
WBC # BLD: 11.44 K/UL — HIGH (ref 4.8–10.8)
WBC # FLD AUTO: 11.44 K/UL — HIGH (ref 4.8–10.8)

## 2024-02-03 PROCEDURE — 99222 1ST HOSP IP/OBS MODERATE 55: CPT

## 2024-02-03 PROCEDURE — 99233 SBSQ HOSP IP/OBS HIGH 50: CPT

## 2024-02-03 PROCEDURE — 99223 1ST HOSP IP/OBS HIGH 75: CPT

## 2024-02-03 PROCEDURE — 93010 ELECTROCARDIOGRAM REPORT: CPT

## 2024-02-03 RX ORDER — MAGNESIUM SULFATE 500 MG/ML
2 VIAL (ML) INJECTION ONCE
Refills: 0 | Status: COMPLETED | OUTPATIENT
Start: 2024-02-03 | End: 2024-02-03

## 2024-02-03 RX ORDER — KETOROLAC TROMETHAMINE 30 MG/ML
15 SYRINGE (ML) INJECTION ONCE
Refills: 0 | Status: DISCONTINUED | OUTPATIENT
Start: 2024-02-03 | End: 2024-02-03

## 2024-02-03 RX ADMIN — Medication 105 MILLIGRAM(S): at 21:33

## 2024-02-03 RX ADMIN — Medication 100 MILLIGRAM(S): at 07:05

## 2024-02-03 RX ADMIN — ENOXAPARIN SODIUM 40 MILLIGRAM(S): 100 INJECTION SUBCUTANEOUS at 07:13

## 2024-02-03 RX ADMIN — Medication 25 GRAM(S): at 18:17

## 2024-02-03 RX ADMIN — Medication 25 GRAM(S): at 11:46

## 2024-02-03 RX ADMIN — Medication 15 MILLIGRAM(S): at 21:33

## 2024-02-03 RX ADMIN — PANTOPRAZOLE SODIUM 40 MILLIGRAM(S): 20 TABLET, DELAYED RELEASE ORAL at 07:12

## 2024-02-03 RX ADMIN — Medication 105 MILLIGRAM(S): at 15:12

## 2024-02-03 RX ADMIN — Medication 1 MILLIGRAM(S): at 11:48

## 2024-02-03 NOTE — CONSULT NOTE ADULT - SUBJECTIVE AND OBJECTIVE BOX
Patient is a 43y old  Female who presents with a chief complaint of     HPI:  Case of 43-year-old female with PMHx of cocaine and alcohol abuse & active smoker, BIBEMS after being found unresponsive on the ground in parking lot behind a liquor store.     walked away from patient for a few minutes and came back to finding patient on the ground. Both  and wife had smoker crack but  is not sure whether it was laced. EMS arrived and gave 2 doses of IM narcan (2mg each), patient became more awake, last one given 15min prior to arrival to ED.    Of note, patient drank vodka earlier today, patient drinks at least 1 pint of alcohol daily.     Patient currently complaining of nausea and vomiting. was vomiting in ED on arrival. Also mentions that she feels hot, sweaty, and mouth feeling dry.  No other complaints were reported.    In the ED, vitals were   · /110 mm Hg  · Heart Rate 88 /min  · Respiration Rate (breaths/min) 18  · Temp (F)98.3 Degrees F  · SpO2 (%)94 %    Labs remarkable for WBC 25k, , ALS:AST 2:1, VBG showing acidosis (lactate elevated then normalized)    CTH: No evidence of acute intracranial pathology.    CT AP with IVC: 1.  No evidence of acute abdominopelvic pathology. 2.  Right gluteal nonspecific fluid collection measuring 4 x 2 x 3.5 cm with mild surrounding stranding (series 3 image 122), possibly related to  injection or procedural changes. 3.  Patchy opacities in the right middle and left lower lobes possibly  infectious or inflammatory. Given history of vomiting, aspiration cannot   be excluded .4.  Small hiatal hernia with mild thickening of the distal esophagus possibly related to chronic reflux disease.    In the ED, given 2L LR and started on narcan drip           (02 Feb 2024 04:24)      PAST MEDICAL & SURGICAL HISTORY:      SOCIAL HX:   smoker, alcohol, cocaine     FAMILY HISTORY:  :  No known cardiovacular family hisotry     ROS:  See HPI     Allergies    penicillin (Unknown)  latex (Unknown)    Intolerances          PHYSICAL EXAM    ICU Vital Signs Last 24 Hrs  T(C): 37.1 (03 Feb 2024 04:09), Max: 37.1 (03 Feb 2024 04:09)  T(F): 98.7 (03 Feb 2024 04:09), Max: 98.7 (03 Feb 2024 04:09)  HR: 61 (03 Feb 2024 04:09) (52 - 77)  BP: 106/50 (03 Feb 2024 04:09) (106/50 - 129/62)  BP(mean): --  ABP: --  ABP(mean): --  RR: 18 (03 Feb 2024 04:09) (18 - 18)  SpO2: 99% (03 Feb 2024 04:09) (95% - 99%)    O2 Parameters below as of 03 Feb 2024 04:09  Patient On (Oxygen Delivery Method): room air            General: In NAD   HEENT:  CELESTINE              Lymphatic system: No cervical LN   Lungs: Bilateral BS  Cardiovascular: Regular  Gastrointestinal: Soft, Positive BS  Musculoskeletal: No clubbing.  Moves all extremities.  Full range of motion   Skin: Warm.  Intact  Neurological: No motor or sensory deficit         LABS:                          13.9   20.50 )-----------( 266      ( 02 Feb 2024 05:00 )             43.4                                               02-02    139  |  104  |  15  ----------------------------<  97  4.9   |  23  |  0.7    Ca    9.2      02 Feb 2024 05:00  Phos  4.9     02-02  Mg     1.7     02-02    TPro  7.1  /  Alb  4.7  /  TBili  0.3  /  DBili  x   /  AST  80<H>  /  ALT  62<H>  /  AlkPhos  94  02-02                                             Urinalysis Basic - ( 02 Feb 2024 05:00 )    Color: x / Appearance: x / SG: x / pH: x  Gluc: 97 mg/dL / Ketone: x  / Bili: x / Urobili: x   Blood: x / Protein: x / Nitrite: x   Leuk Esterase: x / RBC: x / WBC x   Sq Epi: x / Non Sq Epi: x / Bacteria: x                                                  LIVER FUNCTIONS - ( 02 Feb 2024 05:00 )  Alb: 4.7 g/dL / Pro: 7.1 g/dL / ALK PHOS: 94 U/L / ALT: 62 U/L / AST: 80 U/L / GGT: x                                                                                                                                       X-Rays                                                                                     ECHO    MEDICATIONS  (STANDING):  doxycycline IVPB 100 milliGRAM(s) IV Intermittent every 12 hours  enoxaparin Injectable 40 milliGRAM(s) SubCutaneous every 24 hours  folic acid 1 milliGRAM(s) Oral daily  levoFLOXacin IVPB 750 milliGRAM(s) IV Intermittent every 24 hours  pantoprazole    Tablet 40 milliGRAM(s) Oral before breakfast  thiamine IVPB 500 milliGRAM(s) IV Intermittent every 8 hours    MEDICATIONS  (PRN):  LORazepam   Injectable 2 milliGRAM(s) IV Push every 2 hours PRN Symptom-triggered: 2 point increase in CIWA -Ar score and a total score of 7 or LESS

## 2024-02-03 NOTE — CONSULT NOTE ADULT - SUBJECTIVE AND OBJECTIVE BOX
Patient is a 42 y/o with PMHx of ETOH abuse ( Drinks a pint of Vodka daily), and Crack use whom was brought in s/p being found unresponsive after smoking crack in public behind a liquor store. She was reviewed in field by OG Canales and is admitted to Medicine critical care team. Surgery was consulted as patient was found on CT scan to have a right gluteal collection of 4cm. Patient denies any complaints over the involved area. she denies injecting any materials into that area nor any additional parts of her body. She notes Zoster Hx which did involve that area prior. She is being treated for B/L Pulmonary opacities and appears to be clinically stable currently. She has no additional complaints.      PAST MEDICAL & SURGICAL HISTORY:  ETOH Abuse  Crack Use        MEDICATIONS  (STANDING):  doxycycline IVPB 100 milliGRAM(s) IV Intermittent every 12 hours  enoxaparin Injectable 40 milliGRAM(s) SubCutaneous every 24 hours  folic acid 1 milliGRAM(s) Oral daily  levoFLOXacin IVPB 750 milliGRAM(s) IV Intermittent every 24 hours  magnesium sulfate  IVPB 2 Gram(s) IV Intermittent once  pantoprazole    Tablet 40 milliGRAM(s) Oral before breakfast  thiamine IVPB 500 milliGRAM(s) IV Intermittent every 8 hours    MEDICATIONS  (PRN):  LORazepam   Injectable 2 milliGRAM(s) IV Push every 2 hours PRN Symptom-triggered: 2 point increase in CIWA -Ar score and a total score of 7 or LESS      Allergies  penicillin (Unknown)  latex (Unknown)      Review of Systems  General:  Denies Fatigue, Denies Fever, Denies Weakness ,Denies Weight Loss   HEENT: Denies Trouble Swallowing ,Denies  Sore Throat , Denies Change in hearing/vision/speech ,Denies Dizziness    Cardio: Denies  Chest Pain , Palpitations    Respiratory: See HPI  Abdomen: Denies Abdominal pain, nausea, emesis   Neuro: Denies Headache Denies Dizziness, Denies Paresthesias  MSK: Denies pain in Bones/Joints/Muscles   Psych: Patient denies depression, denies suicidal or homicidal ideations  Integ: Patient Denies rash, or new skin lesions     Vital Signs Last 24 Hrs  T(C): 36.7 (03 Feb 2024 07:46), Max: 37.1 (03 Feb 2024 04:09)  T(F): 98 (03 Feb 2024 07:46), Max: 98.7 (03 Feb 2024 04:09)  HR: 69 (03 Feb 2024 07:46) (52 - 69)  BP: 102/53 (03 Feb 2024 07:46) (102/53 - 125/73)  BP(mean): 72 (03 Feb 2024 07:46) (72 - 72)  RR: 18 (03 Feb 2024 07:46) (18 - 18)  SpO2: 98% (03 Feb 2024 07:46) (97% - 99%)    Parameters below as of 03 Feb 2024 07:46  Patient On (Oxygen Delivery Method): room air      Physical Exam  Gen: NAD  Head: NC/AT, no visible deformity  ENT: PERRLA, Sclera Non Icteric   Cardio: S1/S2 No S3/S4, Regular  Resp: CTA B/L  Abdomen: Soft, ND/NT  Neuro: AAOx3  Extremities: FROM x 4  Skin: No jaundice, vesicles crusted over sacrum,. no palpable fluctuance or noted induration of the right gluteal area    Labs:                       11.9   11.44 )-----------( 208      ( 03 Feb 2024 06:16 )             36.3       Auto Neutrophil %: 66.0 % (02-03-24 @ 06:16)  Auto Immature Granulocyte %: 0.3 % (02-03-24 @ 06:16)    02-03    138  |  101  |  9<L>  ----------------------------<  82  4.0   |  26  |  0.6<L>      Calcium: 8.7 mg/dL (02-03-24 @ 06:16)      LFTs:             5.7  | 0.4  | 30       ------------------[74      ( 03 Feb 2024 06:16 )  3.8  | x    | 33            Lactate, Blood: 0.9 mmol/L (02-02-24 @ 05:00)  Blood Gas Venous - Lactate: 1.3 mmol/L (02-02-24 @ 01:57)  Blood Gas Venous - Lactate: 6.1 mmol/L (02-01-24 @ 20:26)      Urinalysis Basic - ( 03 Feb 2024 06:16 )  Color: x / Appearance: x / SG: x / pH: x  Gluc: 82 mg/dL / Ketone: x  / Bili: x / Urobili: x   Blood: x / Protein: x / Nitrite: x   Leuk Esterase: x / RBC: x / WBC x   Sq Epi: x / Non Sq Epi: x / Bacteria: x    RADIOLOGY & ADDITIONAL STUDIES:

## 2024-02-03 NOTE — CONSULT NOTE ADULT - ASSESSMENT
Patient is a 42 y/o with PMHx of ETOH abuse ( Drinks a pint of Vodka daily), and Crack use whom was brought in s/p being found unresponsive after smoking crack in public behind a liquor store. She was reviewed in field by OG Canales and is admitted to Medicine critical care team. Surgery was consulted as patient was found on CT scan to have a right gluteal collection of 4cm. Patient denies any complaints over the involved area. she denies injecting any materials into that area nor any additional parts of her body. She notes Zoster Hx which did involve that area prior. She is being treated for B/L Pulmonary opacities and appears to be clinically stable currently. She has no additional complaints.      4cm Right Gluteal collection   - HD stable and physical exam largely reassuring   - On Unasyn currently  - No need for surgical intervention currently with given exam and lack of direct complaints regarding the right gluteal region

## 2024-02-03 NOTE — CONSULT NOTE ADULT - ASSESSMENT
Impression:     Acute hypoxemic and hypercapnic respiratory failure   Likely due to drug overdose  Responded to Narcan   Aspiration pneumonia   Right gluteal fluid collection     Plan:     CNS: Off narcan. Thiamine and Folate. BZD PRN. CIWA protocol.      HEENT: Aspiration precautions. On room air.     PULMONARY: CXR clear. CT abdomen shows possible aspiration infiltrates. On room air. Recheck VBG to document improvement in Pco2.     CARDIOVASCULAR: Keep equal.     GI: GI prophylaxis. Feeding. Outpatient GI eval.     RENAL: Avoid nephrotoxic agents. No need for abrams. replete lytes as needed/     INFECTIOUS DISEASE: Procal. WBC noted. Check UA, blood culture. IV unasyn to cover possible aspiration as well as possible glutela abscesss. Rapid viral panel negative. ID/burn eval for the fluid collection.     HEMATOLOGICAL: DVT prophylaxis. Duplex LE.     ENDOCRINE: Monitor FS. Keep 140-180.     MUSCULOSKELETAL: Ambulate as tolerated     DISPOSITION: No obvious need for ICU/SDU. Medical floor. Recall PRN.

## 2024-02-03 NOTE — CONSULT NOTE ADULT - ATTENDING COMMENTS
Patient is a 44 y/o with PMHx of ETOH abuse ( Drinks a pint of Vodka daily), and Crack use whom was brought in s/p being found unresponsive after smoking crack in public behind a liquor store. She was reviewed in field by OG Canales and is admitted to Medicine critical care team. Surgery was consulted as patient was found on CT scan to have a right gluteal collection of 4cm. Patient denies any complaints over the involved area. she denies injecting any materials into that area nor any additional parts of her body. She notes Zoster Hx which did involve that area prior. She is being treated for B/L Pulmonary opacities and appears to be clinically stable currently. She has no additional complaints.    Review of Systems  General:  Denies Fatigue, Denies Fever, Denies Weakness ,Denies Weight Loss   HEENT: Denies Trouble Swallowing ,Denies  Sore Throat , Denies Change in hearing/vision/speech ,Denies Dizziness    Cardio: Denies  Chest Pain , Palpitations    Respiratory: See HPI  Abdomen: Denies Abdominal pain, nausea, emesis   Neuro: Denies Headache Denies Dizziness, Denies Paresthesias  MSK: Denies pain in Bones/Joints/Muscles   Psych: Patient denies depression, denies suicidal or homicidal ideations  Integ: Patient Denies rash, or new skin lesions     4cm Right Gluteal collection   - HD stable and physical exam largely reassuring   - On Unasyn currently  - No need for surgical intervention currently with given exam and lack of direct complaints regarding the right gluteal region

## 2024-02-03 NOTE — PROGRESS NOTE ADULT - SUBJECTIVE AND OBJECTIVE BOX
INTERVAL HPI/OVERNIGHT EVENTS:    SUBJECTIVE: Patient seen and examined at bedside.     no cp, sob, abd pain, fever  no ha, dizziness, lightheadedness, syncope    OBJECTIVE:    VITAL SIGNS:  Vital Signs Last 24 Hrs  T(C): 36.7 (03 Feb 2024 07:46), Max: 37.1 (03 Feb 2024 04:09)  T(F): 98 (03 Feb 2024 07:46), Max: 98.7 (03 Feb 2024 04:09)  HR: 69 (03 Feb 2024 07:46) (52 - 69)  BP: 102/53 (03 Feb 2024 07:46) (102/53 - 125/73)  BP(mean): 72 (03 Feb 2024 07:46) (72 - 72)  RR: 18 (03 Feb 2024 07:46) (18 - 18)  SpO2: 98% (03 Feb 2024 07:46) (97% - 99%)    Parameters below as of 03 Feb 2024 07:46  Patient On (Oxygen Delivery Method): room air          PHYSICAL EXAM:    General: NAD  HEENT: NC/AT; PERRL, clear conjunctiva  Neck: supple  Respiratory: bl crackles  Cardiovascular: +S1/S2; RRR  Abdomen: soft, NT/ND; +BS x4  Extremities: WWP, 2+ peripheral pulses b/l; no LE edema  Skin: normal color and turgor; no rash  Neurological:    MEDICATIONS:  MEDICATIONS  (STANDING):  enoxaparin Injectable 40 milliGRAM(s) SubCutaneous every 24 hours  folic acid 1 milliGRAM(s) Oral daily  levoFLOXacin IVPB 750 milliGRAM(s) IV Intermittent every 24 hours  magnesium sulfate  IVPB 2 Gram(s) IV Intermittent once  pantoprazole    Tablet 40 milliGRAM(s) Oral before breakfast  thiamine IVPB 500 milliGRAM(s) IV Intermittent every 8 hours    MEDICATIONS  (PRN):  LORazepam   Injectable 2 milliGRAM(s) IV Push every 2 hours PRN Symptom-triggered: 2 point increase in CIWA -Ar score and a total score of 7 or LESS      ALLERGIES:  Allergies    penicillin (Unknown)  latex (Unknown)    Intolerances        LABS:                        11.9   11.44 )-----------( 208      ( 03 Feb 2024 06:16 )             36.3     Hemoglobin: 11.9 g/dL (02-03 @ 06:16)  Hemoglobin: 13.9 g/dL (02-02 @ 05:00)  Hemoglobin: 14.4 g/dL (02-01 @ 20:31)    CBC Full  -  ( 03 Feb 2024 06:16 )  WBC Count : 11.44 K/uL  RBC Count : 3.90 M/uL  Hemoglobin : 11.9 g/dL  Hematocrit : 36.3 %  Platelet Count - Automated : 208 K/uL  Mean Cell Volume : 93.1 fL  Mean Cell Hemoglobin : 30.5 pg  Mean Cell Hemoglobin Concentration : 32.8 g/dL  Auto Neutrophil # : 7.55 K/uL  Auto Lymphocyte # : 2.76 K/uL  Auto Monocyte # : 0.95 K/uL  Auto Eosinophil # : 0.11 K/uL  Auto Basophil # : 0.03 K/uL  Auto Neutrophil % : 66.0 %  Auto Lymphocyte % : 24.1 %  Auto Monocyte % : 8.3 %  Auto Eosinophil % : 1.0 %  Auto Basophil % : 0.3 %    02-03    138  |  101  |  9<L>  ----------------------------<  82  4.0   |  26  |  0.6<L>    Ca    8.7      03 Feb 2024 06:16  Phos  4.9     02-02  Mg     1.7     02-03    TPro  5.7<L>  /  Alb  3.8  /  TBili  0.4  /  DBili  x   /  AST  30  /  ALT  33  /  AlkPhos  74  02-03    Creatinine Trend: 0.6<--, 0.7<--, 0.9<--  LIVER FUNCTIONS - ( 03 Feb 2024 06:16 )  Alb: 3.8 g/dL / Pro: 5.7 g/dL / ALK PHOS: 74 U/L / ALT: 33 U/L / AST: 30 U/L / GGT: x               hs Troponin:            Urinalysis Basic - ( 03 Feb 2024 06:16 )    Color: x / Appearance: x / SG: x / pH: x  Gluc: 82 mg/dL / Ketone: x  / Bili: x / Urobili: x   Blood: x / Protein: x / Nitrite: x   Leuk Esterase: x / RBC: x / WBC x   Sq Epi: x / Non Sq Epi: x / Bacteria: x      CSF:                      EKG:   MICROBIOLOGY:    IMAGING:      Labs, imaging, EKG personally reviewed    RADIOLOGY & ADDITIONAL TESTS: Reviewed.

## 2024-02-03 NOTE — PROGRESS NOTE ADULT - NSPROGADDITIONALINFOA_GEN_ALL_CORE
#Progress Note Handoff:  Pending (specify):  Consults_________, Tests________, Test Results_______, Other___ciwa, cows, addiction med eval, bcx______  Family discussion: d/w pt at bedside  Disposition: Home___/SNF___/Other________/Unknown at this time_____x___

## 2024-02-03 NOTE — PROGRESS NOTE ADULT - PROBLEM SELECTOR PLAN 1
in setting of crack cocaine use; pt believes may have been laced with fentanyl  resolved, a+o x3  cth neg  ct abd with fluid collection, bl ggo  pt denies injections or narcotic use  s/p narcan, monitor cows  addiction med eval  levaquin  sputum cx, legionella, strep pna, mrsa  bcx  repeat blood gas in setting of crack cocaine use; pt believes may have been laced with fentanyl  resolved, a+o x3  cth neg  ct abd with fluid collection, bl ggo  no intervention per sx  pt denies injections or narcotic use  s/p narcan, monitor cows  addiction med eval  levaquin  sputum cx, legionella, strep pna, mrsa  bcx  repeat blood gas  va duplex

## 2024-02-04 LAB
ALBUMIN SERPL ELPH-MCNC: 3.5 G/DL — SIGNIFICANT CHANGE UP (ref 3.5–5.2)
ALP SERPL-CCNC: 80 U/L — SIGNIFICANT CHANGE UP (ref 30–115)
ALT FLD-CCNC: 22 U/L — SIGNIFICANT CHANGE UP (ref 0–41)
ANION GAP SERPL CALC-SCNC: 11 MMOL/L — SIGNIFICANT CHANGE UP (ref 7–14)
AST SERPL-CCNC: 15 U/L — SIGNIFICANT CHANGE UP (ref 0–41)
BASOPHILS # BLD AUTO: 0.02 K/UL — SIGNIFICANT CHANGE UP (ref 0–0.2)
BASOPHILS NFR BLD AUTO: 0.2 % — SIGNIFICANT CHANGE UP (ref 0–1)
BILIRUB SERPL-MCNC: 0.3 MG/DL — SIGNIFICANT CHANGE UP (ref 0.2–1.2)
BUN SERPL-MCNC: 14 MG/DL — SIGNIFICANT CHANGE UP (ref 10–20)
CALCIUM SERPL-MCNC: 8.8 MG/DL — SIGNIFICANT CHANGE UP (ref 8.4–10.5)
CHLORIDE SERPL-SCNC: 103 MMOL/L — SIGNIFICANT CHANGE UP (ref 98–110)
CO2 SERPL-SCNC: 25 MMOL/L — SIGNIFICANT CHANGE UP (ref 17–32)
CREAT SERPL-MCNC: 0.6 MG/DL — LOW (ref 0.7–1.5)
EGFR: 116 ML/MIN/1.73M2 — SIGNIFICANT CHANGE UP
EOSINOPHIL # BLD AUTO: 0.19 K/UL — SIGNIFICANT CHANGE UP (ref 0–0.7)
EOSINOPHIL NFR BLD AUTO: 2.2 % — SIGNIFICANT CHANGE UP (ref 0–8)
GLUCOSE BLDC GLUCOMTR-MCNC: 130 MG/DL — HIGH (ref 70–99)
GLUCOSE SERPL-MCNC: 81 MG/DL — SIGNIFICANT CHANGE UP (ref 70–99)
HCT VFR BLD CALC: 36.8 % — LOW (ref 37–47)
HGB BLD-MCNC: 11.9 G/DL — LOW (ref 12–16)
IMM GRANULOCYTES NFR BLD AUTO: 0.2 % — SIGNIFICANT CHANGE UP (ref 0.1–0.3)
LYMPHOCYTES # BLD AUTO: 2.32 K/UL — SIGNIFICANT CHANGE UP (ref 1.2–3.4)
LYMPHOCYTES # BLD AUTO: 26.9 % — SIGNIFICANT CHANGE UP (ref 20.5–51.1)
MAGNESIUM SERPL-MCNC: 1.8 MG/DL — SIGNIFICANT CHANGE UP (ref 1.8–2.4)
MCHC RBC-ENTMCNC: 29.8 PG — SIGNIFICANT CHANGE UP (ref 27–31)
MCHC RBC-ENTMCNC: 32.3 G/DL — SIGNIFICANT CHANGE UP (ref 32–37)
MCV RBC AUTO: 92.2 FL — SIGNIFICANT CHANGE UP (ref 81–99)
MONOCYTES # BLD AUTO: 1 K/UL — HIGH (ref 0.1–0.6)
MONOCYTES NFR BLD AUTO: 11.6 % — HIGH (ref 1.7–9.3)
NEUTROPHILS # BLD AUTO: 5.07 K/UL — SIGNIFICANT CHANGE UP (ref 1.4–6.5)
NEUTROPHILS NFR BLD AUTO: 58.9 % — SIGNIFICANT CHANGE UP (ref 42.2–75.2)
NRBC # BLD: 0 /100 WBCS — SIGNIFICANT CHANGE UP (ref 0–0)
PLATELET # BLD AUTO: 207 K/UL — SIGNIFICANT CHANGE UP (ref 130–400)
PMV BLD: 11.8 FL — HIGH (ref 7.4–10.4)
POTASSIUM SERPL-MCNC: 4 MMOL/L — SIGNIFICANT CHANGE UP (ref 3.5–5)
POTASSIUM SERPL-SCNC: 4 MMOL/L — SIGNIFICANT CHANGE UP (ref 3.5–5)
PROT SERPL-MCNC: 5.6 G/DL — LOW (ref 6–8)
RBC # BLD: 3.99 M/UL — LOW (ref 4.2–5.4)
RBC # FLD: 12.4 % — SIGNIFICANT CHANGE UP (ref 11.5–14.5)
SODIUM SERPL-SCNC: 139 MMOL/L — SIGNIFICANT CHANGE UP (ref 135–146)
WBC # BLD: 8.62 K/UL — SIGNIFICANT CHANGE UP (ref 4.8–10.8)
WBC # FLD AUTO: 8.62 K/UL — SIGNIFICANT CHANGE UP (ref 4.8–10.8)

## 2024-02-04 PROCEDURE — 99232 SBSQ HOSP IP/OBS MODERATE 35: CPT

## 2024-02-04 RX ORDER — ACETAMINOPHEN 500 MG
650 TABLET ORAL EVERY 6 HOURS
Refills: 0 | Status: DISCONTINUED | OUTPATIENT
Start: 2024-02-04 | End: 2024-02-05

## 2024-02-04 RX ORDER — LANOLIN ALCOHOL/MO/W.PET/CERES
5 CREAM (GRAM) TOPICAL ONCE
Refills: 0 | Status: COMPLETED | OUTPATIENT
Start: 2024-02-04 | End: 2024-02-04

## 2024-02-04 RX ADMIN — Medication 105 MILLIGRAM(S): at 21:17

## 2024-02-04 RX ADMIN — Medication 1 TABLET(S): at 13:37

## 2024-02-04 RX ADMIN — Medication 650 MILLIGRAM(S): at 18:00

## 2024-02-04 RX ADMIN — PANTOPRAZOLE SODIUM 40 MILLIGRAM(S): 20 TABLET, DELAYED RELEASE ORAL at 06:07

## 2024-02-04 RX ADMIN — Medication 650 MILLIGRAM(S): at 17:50

## 2024-02-04 RX ADMIN — Medication 105 MILLIGRAM(S): at 13:40

## 2024-02-04 RX ADMIN — Medication 105 MILLIGRAM(S): at 06:07

## 2024-02-04 RX ADMIN — Medication 1 MILLIGRAM(S): at 13:38

## 2024-02-04 RX ADMIN — ENOXAPARIN SODIUM 40 MILLIGRAM(S): 100 INJECTION SUBCUTANEOUS at 06:07

## 2024-02-04 NOTE — PROGRESS NOTE ADULT - PROBLEM SELECTOR PLAN 1
in setting of crack cocaine use; pt believes may have been laced with fentanyl  resolved, a+o x3  cth neg  ct abd with fluid collection, bl ggo  no intervention per sx  pt denies injections or narcotic use  s/p narcan, mentation back to baseline - comprehending her care this am  -CATCH team eval   levaquin empiric abx for aspiration pnuemonitis   sputum cx, legionella, strep pna, mrsa, BCx

## 2024-02-04 NOTE — PROGRESS NOTE ADULT - SUBJECTIVE AND OBJECTIVE BOX
INTERVAL HPI/OVERNIGHT EVENTS:    SUBJECTIVE: Patient seen and examined at bedside.     - at bedside   -no overnight events notified   -continue with abx - ID consult placed     Vital Signs Last 24 Hrs  T(C): 37.7 (04 Feb 2024 06:09), Max: 37.7 (04 Feb 2024 06:09)  T(F): 99.8 (04 Feb 2024 06:09), Max: 99.8 (04 Feb 2024 06:09)  HR: 60 (04 Feb 2024 06:09) (60 - 70)  BP: 110/59 (04 Feb 2024 06:09) (108/54 - 121/58)  BP(mean): 79 (04 Feb 2024 06:09) (74 - 79)  RR: 18 (04 Feb 2024 06:09) (18 - 18)  SpO2: 97% (03 Feb 2024 18:45) (96% - 97%)    Parameters below as of 03 Feb 2024 18:45  Patient On (Oxygen Delivery Method): room air        PHYSICAL EXAM:    General: Not in distress - speaking in full sentences   HEENT: facial bruising from fall   Respiratory: bl crackles  Cardiovascular: +S1/S2; RRR  Abdomen: soft, NT/ND; +BS x4  Extremities: WWP, 2+ peripheral pulses b/l; no LE edema  Neurological: aao x3         LABS:                        11.9   11.44 )-----------( 208      ( 03 Feb 2024 06:16 )             36.3     Hemoglobin: 11.9 g/dL (02-03 @ 06:16)  Hemoglobin: 13.9 g/dL (02-02 @ 05:00)  Hemoglobin: 14.4 g/dL (02-01 @ 20:31)    CBC Full  -  ( 03 Feb 2024 06:16 )  WBC Count : 11.44 K/uL  RBC Count : 3.90 M/uL  Hemoglobin : 11.9 g/dL  Hematocrit : 36.3 %  Platelet Count - Automated : 208 K/uL  Mean Cell Volume : 93.1 fL  Mean Cell Hemoglobin : 30.5 pg  Mean Cell Hemoglobin Concentration : 32.8 g/dL  Auto Neutrophil # : 7.55 K/uL  Auto Lymphocyte # : 2.76 K/uL  Auto Monocyte # : 0.95 K/uL  Auto Eosinophil # : 0.11 K/uL  Auto Basophil # : 0.03 K/uL  Auto Neutrophil % : 66.0 %  Auto Lymphocyte % : 24.1 %  Auto Monocyte % : 8.3 %  Auto Eosinophil % : 1.0 %  Auto Basophil % : 0.3 %    02-03    138  |  101  |  9<L>  ----------------------------<  82  4.0   |  26  |  0.6<L>    Ca    8.7      03 Feb 2024 06:16  Phos  4.9     02-02  Mg     1.7     02-03    TPro  5.7<L>  /  Alb  3.8  /  TBili  0.4  /  DBili  x   /  AST  30  /  ALT  33  /  AlkPhos  74  02-03    Creatinine Trend: 0.6<--, 0.7<--, 0.9<--  LIVER FUNCTIONS - ( 03 Feb 2024 06:16 )  Alb: 3.8 g/dL / Pro: 5.7 g/dL / ALK PHOS: 74 U/L / ALT: 33 U/L / AST: 30 U/L / GGT: x               MEDICATIONS  (STANDING):  enoxaparin Injectable 40 milliGRAM(s) SubCutaneous every 24 hours  folic acid 1 milliGRAM(s) Oral daily  levoFLOXacin IVPB 750 milliGRAM(s) IV Intermittent every 24 hours  multivitamin 1 Tablet(s) Oral daily  pantoprazole    Tablet 40 milliGRAM(s) Oral before breakfast  thiamine IVPB 500 milliGRAM(s) IV Intermittent every 8 hours    MEDICATIONS  (PRN):  LORazepam   Injectable 2 milliGRAM(s) IV Push every 2 hours PRN Symptom-triggered: 2 point increase in CIWA -Ar score and a total score of 7 or LESS

## 2024-02-04 NOTE — PROGRESS NOTE ADULT - PROBLEM SELECTOR PLAN 2
drinks 1 pint liquor daily  ciwa protocol   CATCH team consult   -refrain from alcohol intake
drinks 1 pint liquor daily  ciwa  symptom triggered ativan  addiction med eval  folic acid, thiamine, multivit

## 2024-02-04 NOTE — PROGRESS NOTE ADULT - ATTENDING COMMENTS
ACS Attending  Note Attestation    Patient is examined and evaluated at the bedside with the residents/PAs. Treatment plan discussed with the team, nurses, and consulting physicians and consulting teams. Medications, radiological studies and all other relevant studies reviewed.     DOUG JOSHI Patient is a 43y old  Female who presents with a chief complaint of right buttocks seroma    Vital Signs Last 24 Hrs  T(C): 37.7 (04 Feb 2024 06:09), Max: 37.7 (04 Feb 2024 06:09)  T(F): 99.8 (04 Feb 2024 06:09), Max: 99.8 (04 Feb 2024 06:09)  HR: 60 (04 Feb 2024 06:09) (60 - 70)  BP: 110/59 (04 Feb 2024 06:09) (108/54 - 121/58)  BP(mean): 79 (04 Feb 2024 06:09) (74 - 79)  RR: 18 (04 Feb 2024 06:09) (18 - 18)  SpO2: 97% (03 Feb 2024 18:45) (96% - 97%)    Parameters below as of 03 Feb 2024 18:45  Patient On (Oxygen Delivery Method): room air                            11.9   8.62  )-----------( 207      ( 04 Feb 2024 05:34 )             36.8     02-04    139  |  103  |  14  ----------------------------<  81  4.0   |  25  |  0.6<L>    Ca    8.8      04 Feb 2024 05:34  Mg     1.8     02-04    TPro  5.6<L>  /  Alb  3.5  /  TBili  0.3  /  DBili  x   /  AST  15  /  ALT  22  /  AlkPhos  80  02-04      Diagnosis: seroma right buttocks    Plan:	  - no intervention from surgery stand point since the size is very small  - if needed refer to IR for sono guided aspiration  - supportive care  - GI/DVT prophylaxis  - pain management  - incentive spirometer    - follow up consults  - repeat studies as needed  - replace electrolytes  - case management evaluation     Jessie Haider MD, FACS  Trauma/ACS/Surgical Critical Care Attending

## 2024-02-04 NOTE — PATIENT PROFILE ADULT - HOW PATIENT ADDRESSED, PROFILE
----- Message from Zachariah Gale MD sent at 1/8/2020  4:14 PM EST -----  Dolly Phillips,    Can you let her know that she has osteoporosis (weak bones) and this is likely related to the chronic steroids she takes  I would like her to follow-up with her PCP regarding the osteoporosis, and I would like her to follow-up in the office in the next 1-2 months to discuss her inflammatory bowel disease       Thanks,  PRANAV Puentes Rani

## 2024-02-04 NOTE — PROGRESS NOTE ADULT - SUBJECTIVE AND OBJECTIVE BOX
GENERAL SURGERY PROGRESS NOTE     ADI DOUG  [address] [place] [address] day :2d      Surgical Attending: Jessie Haider   Overnight events: No acute events overnight. Pt has remained hemodynamically stable and afebrile overnight    T(F): 98.8 (02-03-24 @ 21:45), Max: 98.8 (02-03-24 @ 21:45)  HR: 67 (02-03-24 @ 21:45) (65 - 70)  BP: 108/54 (02-03-24 @ 21:45) (102/53 - 121/58)  ABP: --  ABP(mean): --  RR: 18 (02-03-24 @ 21:45) (18 - 18)  SpO2: 97% (02-03-24 @ 18:45) (96% - 98%)    IN'S / OUT's:      PHYSICAL EXAM:  GENERAL: NAD  CHEST/LUNG: Clear to auscultation bilaterally  HEART: Regular rate and rhythm  ABDOMEN: Soft, Nontender, Nondistended;   EXTREMITIES:  No clubbing, cyanosis, or edema  SKIN: dry crusted vesicles appreciated on sacral region       LABS  Labs:  CAPILLARY BLOOD GLUCOSE                              11.9   11.44 )-----------( 208      ( 03 Feb 2024 06:16 )             36.3       Auto Neutrophil %: 66.0 % (02-03-24 @ 06:16)  Auto Immature Granulocyte %: 0.3 % (02-03-24 @ 06:16)    02-03    138  |  101  |  9<L>  ----------------------------<  82  4.0   |  26  |  0.6<L>      Calcium: 8.7 mg/dL (02-03-24 @ 06:16)      LFTs:             5.7  | 0.4  | 30       ------------------[74      ( 03 Feb 2024 06:16 )  3.8  | x    | 33          Lipase:x      Amylase:x         Lactate, Blood: 0.9 mmol/L (02-02-24 @ 05:00)  Blood Gas Venous - Lactate: 1.3 mmol/L (02-02-24 @ 01:57)  Blood Gas Venous - Lactate: 6.1 mmol/L (02-01-24 @ 20:26)      Coags:            Urinalysis Basic - ( 03 Feb 2024 06:16 )    Color: x / Appearance: x / SG: x / pH: x  Gluc: 82 mg/dL / Ketone: x  / Bili: x / Urobili: x   Blood: x / Protein: x / Nitrite: x   Leuk Esterase: x / RBC: x / WBC x   Sq Epi: x / Non Sq Epi: x / Bacteria: x        Culture - Blood (collected 02 Feb 2024 11:11)  Source: .Blood None  Preliminary Report (03 Feb 2024 23:02):    No growth at 24 hours          RADIOLOGY & ADDITIONAL TESTS:      A/P:  DOUG JOSHI is a 44 y/o with PMHx of ETOH abuse ( Drinks a pint of Vodka daily), and Crack use whom was brought in s/p being found unresponsive after smoking crack in public behind a liquor store. She was reviewed in field by OG Canales and is admitted to Medicine critical care team. Surgery was consulted as patient was found on CT scan to have a right gluteal collection of 4cm. Patient denies any complaints over the involved area. she denies injecting any materials into that area nor any additional parts of her body. She notes Zoster Hx which did involve that area prior. She is being treated for B/L Pulmonary opacities and appears to be clinically stable currently.      PLAN:   - continue with epimeric antibiotics for pneumonia per medicine team  - no acute surgical intervention at this time  - DVT and GI ppx  - multi modal pain control  - rest of care per medicine team      #Antibiotics: levoFLOXacin IVPB 750 milliGRAM(s) IV Intermittent every 24 hours, 02-03-24 @ 12:03   Day **  #DVT ppx: enoxaparin Injectable 40 milliGRAM(s) SubCutaneous every 24 hours    #GI ppx: pantoprazole    Tablet 40 milliGRAM(s) Oral before breakfast    Disposition:  4A    Above plan discussed with Attending Surgeon Dr. Haider  , patient, patient family, and Primary team    TAP (Trauma, Acute care, Pediatrics) Spectra [phone]     GENERAL SURGERY PROGRESS NOTE     ADI DOUG  [address] [place] [address] day :2d      Surgical Attending: Jessie Haider   Overnight events: No acute events overnight. Pt has remained hemodynamically stable and afebrile overnight    T(F): 98.8 (02-03-24 @ 21:45), Max: 98.8 (02-03-24 @ 21:45)  HR: 67 (02-03-24 @ 21:45) (65 - 70)  BP: 108/54 (02-03-24 @ 21:45) (102/53 - 121/58)  ABP: --  ABP(mean): --  RR: 18 (02-03-24 @ 21:45) (18 - 18)  SpO2: 97% (02-03-24 @ 18:45) (96% - 98%)    IN'S / OUT's:      PHYSICAL EXAM:  GENERAL: NAD  CHEST/LUNG: Clear to auscultation bilaterally  HEART: Regular rate and rhythm  ABDOMEN: Soft, Nontender, Nondistended;   EXTREMITIES:  No clubbing, cyanosis, or edema  SKIN: dry crusted vesicles appreciated on sacral region       LABS  Labs:  CAPILLARY BLOOD GLUCOSE                              11.9   11.44 )-----------( 208      ( 03 Feb 2024 06:16 )             36.3       Auto Neutrophil %: 66.0 % (02-03-24 @ 06:16)  Auto Immature Granulocyte %: 0.3 % (02-03-24 @ 06:16)    02-03    138  |  101  |  9<L>  ----------------------------<  82  4.0   |  26  |  0.6<L>      Calcium: 8.7 mg/dL (02-03-24 @ 06:16)      LFTs:             5.7  | 0.4  | 30       ------------------[74      ( 03 Feb 2024 06:16 )  3.8  | x    | 33          Lipase:x      Amylase:x         Lactate, Blood: 0.9 mmol/L (02-02-24 @ 05:00)  Blood Gas Venous - Lactate: 1.3 mmol/L (02-02-24 @ 01:57)  Blood Gas Venous - Lactate: 6.1 mmol/L (02-01-24 @ 20:26)      Coags:            Urinalysis Basic - ( 03 Feb 2024 06:16 )    Color: x / Appearance: x / SG: x / pH: x  Gluc: 82 mg/dL / Ketone: x  / Bili: x / Urobili: x   Blood: x / Protein: x / Nitrite: x   Leuk Esterase: x / RBC: x / WBC x   Sq Epi: x / Non Sq Epi: x / Bacteria: x        Culture - Blood (collected 02 Feb 2024 11:11)  Source: .Blood None  Preliminary Report (03 Feb 2024 23:02):    No growth at 24 hours          RADIOLOGY & ADDITIONAL TESTS:      A/P:  DOUG JOSHI is a 44 y/o with PMHx of ETOH abuse ( Drinks a pint of Vodka daily), and Crack use whom was brought in s/p being found unresponsive after smoking crack in public behind a liquor store. She was reviewed in field by OG Canales and is admitted to Medicine critical care team. Surgery was consulted as patient was found on CT scan to have a right gluteal collection of 4cm. Patient denies any complaints over the involved area. she denies injecting any materials into that area nor any additional parts of her body. She notes Zoster Hx which did involve that area prior. She is being treated for B/L Pulmonary opacities and appears to be clinically stable currently.      PLAN:   - continue with epimeric antibiotics for pneumonia per medicine team  - no acute surgical intervention at this time  - DVT and GI ppx  - multi modal pain control  - rest of care per medicine team      #Antibiotics: levoFLOXacin IVPB 750 milliGRAM(s) IV Intermittent every 24 hours, 02-03-24 @ 12:03   Day **  #DVT ppx: enoxaparin Injectable 40 milliGRAM(s) SubCutaneous every 24 hours    #GI ppx: pantoprazole    Tablet 40 milliGRAM(s) Oral before breakfast    Disposition:  4A    Above plan will be discussed with Attending Surgeon Dr. Haider  , patient, patient family, and Primary team    TAP (Trauma, Acute care, Pediatrics) Spectra [phone]

## 2024-02-05 VITALS
SYSTOLIC BLOOD PRESSURE: 116 MMHG | OXYGEN SATURATION: 99 % | DIASTOLIC BLOOD PRESSURE: 60 MMHG | HEART RATE: 60 BPM | RESPIRATION RATE: 18 BRPM

## 2024-02-05 PROCEDURE — 99232 SBSQ HOSP IP/OBS MODERATE 35: CPT

## 2024-02-05 PROCEDURE — 93970 EXTREMITY STUDY: CPT | Mod: 26

## 2024-02-05 RX ORDER — FOLIC ACID 0.8 MG
1 TABLET ORAL
Qty: 30 | Refills: 0
Start: 2024-02-05 | End: 2024-03-05

## 2024-02-05 RX ORDER — CHLORHEXIDINE GLUCONATE 213 G/1000ML
1 SOLUTION TOPICAL DAILY
Refills: 0 | Status: DISCONTINUED | OUTPATIENT
Start: 2024-02-05 | End: 2024-02-05

## 2024-02-05 RX ORDER — FOLIC ACID 0.8 MG
1 TABLET ORAL
Qty: 0 | Refills: 0 | DISCHARGE
Start: 2024-02-05

## 2024-02-05 RX ORDER — LEVOFLOXACIN 5 MG/ML
1 INJECTION, SOLUTION INTRAVENOUS
Qty: 3 | Refills: 0
Start: 2024-02-05 | End: 2024-02-07

## 2024-02-05 RX ORDER — THIAMINE MONONITRATE (VIT B1) 100 MG
1 TABLET ORAL
Qty: 30 | Refills: 0
Start: 2024-02-05 | End: 2024-03-05

## 2024-02-05 RX ORDER — LEVOFLOXACIN 5 MG/ML
1 INJECTION, SOLUTION INTRAVENOUS
Qty: 2 | Refills: 0
Start: 2024-02-05 | End: 2024-02-06

## 2024-02-05 RX ADMIN — PANTOPRAZOLE SODIUM 40 MILLIGRAM(S): 20 TABLET, DELAYED RELEASE ORAL at 06:16

## 2024-02-05 RX ADMIN — Medication 1 TABLET(S): at 13:15

## 2024-02-05 RX ADMIN — Medication 1 MILLIGRAM(S): at 13:15

## 2024-02-05 NOTE — PROGRESS NOTE ADULT - ASSESSMENT
43F PMHx crack cocaine use, alcohol abuse here with unresponsiveness s/p narcan in ED.   
43F PMHx crack cocaine use, alcohol abuse here with unresponsiveness s/p narcan in ED.          # Unresponsiveness.    in setting of crack cocaine use; pt believes may have been laced with fentanyl  resolved, a+o x3  cth neg  ct abd with fluid collection, bl ggo  no intervention per sx  pt denies injections or narcotic use  s/p narcan, mentation back to baseline - comprehending her care this am  -CATCH team eval   levaquin empiric abx for aspiration pnuemonitis       # Alcohol abuse.   drinks 1 pint liquor daily for last year-- is not withdrawing  ciwa protocol   CATCH team consult   -refrain from alcohol intake.      #Gluteal abscess-- not visible no tenderness seen by surgery. Patient injects drugs.  catch team eval.    DC plan today-- after catch team spent more than 30mins
43F PMHx crack cocaine use, alcohol abuse here with unresponsiveness s/p narcan in ED.

## 2024-02-05 NOTE — PROGRESS NOTE ADULT - SUBJECTIVE AND OBJECTIVE BOX
SUBJECTIVE:    Patient is a 43y old Female who presents with a chief complaint of Unresponsiveness (05 Feb 2024 07:47)    Currently admitted to medicine with the primary diagnosis of Pneumonia, aspiration       Today is hospital day 3d.     PAST MEDICAL & SURGICAL HISTORY    ALLERGIES:  penicillin (Unknown)  latex (Unknown)    MEDICATIONS:  STANDING MEDICATIONS  enoxaparin Injectable 40 milliGRAM(s) SubCutaneous every 24 hours  folic acid 1 milliGRAM(s) Oral daily  levoFLOXacin IVPB 750 milliGRAM(s) IV Intermittent every 24 hours  multivitamin 1 Tablet(s) Oral daily  pantoprazole    Tablet 40 milliGRAM(s) Oral before breakfast  thiamine IVPB 500 milliGRAM(s) IV Intermittent every 8 hours    PRN MEDICATIONS  acetaminophen     Tablet .. 650 milliGRAM(s) Oral every 6 hours PRN  LORazepam   Injectable 2 milliGRAM(s) IV Push every 2 hours PRN    VITALS:   T(F): 98.5  HR: 63  BP: 108/59  RR: 18  SpO2: --    LABS:                        11.9   8.62  )-----------( 207      ( 04 Feb 2024 05:34 )             36.8     02-04    139  |  103  |  14  ----------------------------<  81  4.0   |  25  |  0.6<L>    Ca    8.8      04 Feb 2024 05:34  Mg     1.8     02-04    TPro  5.6<L>  /  Alb  3.5  /  TBili  0.3  /  DBili  x   /  AST  15  /  ALT  22  /  AlkPhos  80  02-04      Urinalysis Basic - ( 04 Feb 2024 05:34 )    Color: x / Appearance: x / SG: x / pH: x  Gluc: 81 mg/dL / Ketone: x  / Bili: x / Urobili: x   Blood: x / Protein: x / Nitrite: x   Leuk Esterase: x / RBC: x / WBC x   Sq Epi: x / Non Sq Epi: x / Bacteria: x                RADIOLOGY:    PHYSICAL EXAM:  GEN: No acute distress  LUNGS: Clear to auscultation bilaterally   HEART: S1/S2 present. RRR.   ABD/ GI: Soft, non-tender, non-distended. Bowel sounds present  EXT: NC/NC/NE/2+PP/STARKS  NEURO: AAOX3

## 2024-02-05 NOTE — CHART NOTE - NSCHARTNOTEFT_GEN_A_CORE
I was called and informed that patient wants to leave the hospital.   I saw the patient and spoke to her, she said that she has a family emergency and has to leave now.  There is no signs or symptoms of withdrawal. Her uncle was at bedside too. Discussed the risks of leaving the hospital.   Patient is AAOx4, aware and has the capacity for decision making. She is aware that she is being treated for pneumonia and still have 2 days of antibiotics to receive.  Antibiotics sent to her pharmacy.   Patient signed AMA form and she reported that she will follow up as out.

## 2024-02-05 NOTE — CONSULT NOTE ADULT - SUBJECTIVE AND OBJECTIVE BOX
ADIDOUG JACOBO  43y, Female  Allergy: penicillin (Unknown)  latex (Unknown)      CHIEF COMPLAINT:     LOS  3d    HPI:  Case of 43-year-old female with PMHx of cocaine and alcohol abuse & active smoker, BIBEMS after being found unresponsive on the ground in parking lot behind a liquor store.     walked away from patient for a few minutes and came back to finding patient on the ground. Both  and wife had smoker crack but  is not sure whether it was laced. EMS arrived and gave 2 doses of IM narcan (2mg each), patient became more awake, last one given 15min prior to arrival to ED.    Of note, patient drank vodka earlier today, patient drinks at least 1 pint of alcohol daily.     Patient currently complaining of nausea and vomiting. was vomiting in ED on arrival. Also mentions that she feels hot, sweaty, and mouth feeling dry.  No other complaints were reported.    In the ED, vitals were   · /110 mm Hg  · Heart Rate 88 /min  · Respiration Rate (breaths/min) 18  · Temp (F)98.3 Degrees F  · SpO2 (%)94 %    Labs remarkable for WBC 25k, , ALS:AST 2:1, VBG showing acidosis (lactate elevated then normalized)    CTH: No evidence of acute intracranial pathology.    CT AP with IVC: 1.  No evidence of acute abdominopelvic pathology. 2.  Right gluteal nonspecific fluid collection measuring 4 x 2 x 3.5 cm with mild surrounding stranding (series 3 image 122), possibly related to  injection or procedural changes. 3.  Patchy opacities in the right middle and left lower lobes possibly  infectious or inflammatory. Given history of vomiting, aspiration cannot   be excluded .4.  Small hiatal hernia with mild thickening of the distal esophagus possibly related to chronic reflux disease.    In the ED, given 2L LR and started on narcan drip           (02 Feb 2024 04:24)      INFECTIOUS DISEASE HISTORY:  History as above.    PAST MEDICAL & SURGICAL HISTORY:  as above      FAMILY HISTORY  Family history reviewed and non-contributory      SOCIAL HISTORY  Social History:  Alcohol use as above  SMokes crack    ROS  General: Denies rigors, nightsweats  HEENT: Denies headache, rhinorrhea, sore throat, eye pain  CV: Denies CP, palpitations  PULM: Denies wheezing, hemoptysis  GI: Denies hematemesis, hematochezia, melena  : Denies discharge, hematuria  MSK: Denies arthralgias, myalgias  SKIN: Denies rash, lesions  NEURO: Denies paresthesias, weakness  PSYCH: Denies depression, anxiety    VITALS:  T(F): 98.5, Max: 98.5 (02-05-24 @ 06:03)  HR: 63  BP: 108/59  RR: 18Vital Signs Last 24 Hrs  T(C): 36.9 (05 Feb 2024 06:03), Max: 36.9 (05 Feb 2024 06:03)  T(F): 98.5 (05 Feb 2024 06:03), Max: 98.5 (05 Feb 2024 06:03)  HR: 63 (05 Feb 2024 06:03) (61 - 63)  BP: 108/59 (05 Feb 2024 06:03) (107/53 - 108/59)  BP(mean): 77 (04 Feb 2024 19:57) (77 - 77)  RR: 18 (05 Feb 2024 06:03) (18 - 20)  SpO2: --        PHYSICAL EXAM:  Gen: NAD, resting in bed  HEENT: Normocephalic, atraumatic  Neck: supple, no lymphadenopathy  CV: Regular rate & regular rhythm  Lungs: decreased BS at bases, no fremitus  Abdomen: Soft, BS present  Ext: Warm, well perfused  Neuro: non focal, awake  Skin: no rash, no erythema  Lines: no phlebitis    TESTS & MEASUREMENTS:                        11.9   8.62  )-----------( 207      ( 04 Feb 2024 05:34 )             36.8     02-04    139  |  103  |  14  ----------------------------<  81  4.0   |  25  |  0.6<L>    Ca    8.8      04 Feb 2024 05:34  Mg     1.8     02-04    TPro  5.6<L>  /  Alb  3.5  /  TBili  0.3  /  DBili  x   /  AST  15  /  ALT  22  /  AlkPhos  80  02-04      LIVER FUNCTIONS - ( 04 Feb 2024 05:34 )  Alb: 3.5 g/dL / Pro: 5.6 g/dL / ALK PHOS: 80 U/L / ALT: 22 U/L / AST: 15 U/L / GGT: x           Urinalysis Basic - ( 04 Feb 2024 05:34 )    Color: x / Appearance: x / SG: x / pH: x  Gluc: 81 mg/dL / Ketone: x  / Bili: x / Urobili: x   Blood: x / Protein: x / Nitrite: x   Leuk Esterase: x / RBC: x / WBC x   Sq Epi: x / Non Sq Epi: x / Bacteria: x        Culture - Blood (collected 02-02-24 @ 11:11)  Source: .Blood None  Preliminary Report (02-04-24 @ 23:01):    No growth at 48 Hours        Lactate, Blood: 0.9 mmol/L (02-02-24 @ 05:00)  Blood Gas Venous - Lactate: 1.3 mmol/L (02-02-24 @ 01:57)  Blood Gas Venous - Lactate: 6.1 mmol/L (02-01-24 @ 20:26)      INFECTIOUS DISEASES TESTING  Procalcitonin, Serum: 0.71 ng/mL (02-02-24 @ 11:11)      RADIOLOGY & ADDITIONAL TESTS:  I have personally reviewed the last Chest xray  CXR      CT      CARDIOLOGY TESTING  12 Lead ECG:   Ventricular Rate 74 BPM    Atrial Rate 74 BPM    P-R Interval 146 ms    QRS Duration 78 ms    Q-T Interval 410 ms    QTC Calculation(Bazett) 455 ms    P Axis 77 degrees    R Axis 64 degrees    T Axis 67 degrees    Diagnosis Line *** Poor data quality, interpretation may be adversely affected  Normal sinus rhythm  Possible Left atrial enlargement  Borderline ECG    Confirmed by RENA HUERTA MD ([id number]) on 2/2/2024 7:07:24 AM (02-01-24 @ 22:33)      MEDICATIONS  enoxaparin Injectable 40 SubCutaneous every 24 hours  folic acid 1 Oral daily  levoFLOXacin IVPB 750 IV Intermittent every 24 hours  multivitamin 1 Oral daily  pantoprazole    Tablet 40 Oral before breakfast  thiamine IVPB 500 IV Intermittent every 8 hours      Weight  Weight (kg): 68 (02-01-24 @ 20:04)    ANTIBIOTICS:  levoFLOXacin IVPB 750 milliGRAM(s) IV Intermittent every 24 hours      ALLERGIES:  penicillin (Unknown)  latex (Unknown)       ADIDUOG JACOBO  43y, Female  Allergy: penicillin (Unknown)  latex (Unknown)      CHIEF COMPLAINT:     LOS  3d    HPI:  Case of 43-year-old female with PMHx of cocaine and alcohol abuse & active smoker, BIBEMS after being found unresponsive on the ground in parking lot behind a liquor store.     walked away from patient for a few minutes and came back to finding patient on the ground. Both  and wife had smoker crack but  is not sure whether it was laced. EMS arrived and gave 2 doses of IM narcan (2mg each), patient became more awake, last one given 15min prior to arrival to ED.    Of note, patient drank vodka earlier today, patient drinks at least 1 pint of alcohol daily.     Patient currently complaining of nausea and vomiting. was vomiting in ED on arrival. Also mentions that she feels hot, sweaty, and mouth feeling dry.  No other complaints were reported.    In the ED, vitals were   · /110 mm Hg  · Heart Rate 88 /min  · Respiration Rate (breaths/min) 18  · Temp (F)98.3 Degrees F  · SpO2 (%)94 %    Labs remarkable for WBC 25k, , ALS:AST 2:1, VBG showing acidosis (lactate elevated then normalized)    CTH: No evidence of acute intracranial pathology.    CT AP with IVC: 1.  No evidence of acute abdominopelvic pathology. 2.  Right gluteal nonspecific fluid collection measuring 4 x 2 x 3.5 cm with mild surrounding stranding (series 3 image 122), possibly related to  injection or procedural changes. 3.  Patchy opacities in the right middle and left lower lobes possibly  infectious or inflammatory. Given history of vomiting, aspiration cannot   be excluded .4.  Small hiatal hernia with mild thickening of the distal esophagus possibly related to chronic reflux disease.    In the ED, given 2L LR and started on narcan drip           (02 Feb 2024 04:24)      INFECTIOUS DISEASE HISTORY:  History as above.  Denies any shortness of breath.   Has occaisional coughing.   Denies chest pain/pleuritic chest pain.   Denies nausea, vomiting, abdominal pain.     PAST MEDICAL & SURGICAL HISTORY:  as above      FAMILY HISTORY  Family history reviewed and non-contributory      SOCIAL HISTORY  Social History:  Alcohol use as above  SMokes crack    ROS  General: Denies rigors, nightsweats  HEENT: Denies headache, rhinorrhea, sore throat, eye pain  CV: Denies CP, palpitations  PULM: Denies wheezing, hemoptysis  GI: Denies hematemesis, hematochezia, melena  : Denies discharge, hematuria  MSK: Denies arthralgias, myalgias  SKIN: Denies rash, lesions  NEURO: Denies paresthesias, weakness  PSYCH: Denies depression, anxiety    VITALS:  T(F): 98.5, Max: 98.5 (02-05-24 @ 06:03)  HR: 63  BP: 108/59  RR: 18Vital Signs Last 24 Hrs  T(C): 36.9 (05 Feb 2024 06:03), Max: 36.9 (05 Feb 2024 06:03)  T(F): 98.5 (05 Feb 2024 06:03), Max: 98.5 (05 Feb 2024 06:03)  HR: 63 (05 Feb 2024 06:03) (61 - 63)  BP: 108/59 (05 Feb 2024 06:03) (107/53 - 108/59)  BP(mean): 77 (04 Feb 2024 19:57) (77 - 77)  RR: 18 (05 Feb 2024 06:03) (18 - 20)  SpO2: --        PHYSICAL EXAM:  Gen: NAD, resting in bed  HEENT: Normocephalic, atraumatic  Neck: supple, no lymphadenopathy  CV: Regular rate & regular rhythm  Lungs: decreased BS at bases, no fremitus  Abdomen: Soft, BS present  Ext: Warm, well perfused  Neuro: non focal, awake  Skin: no rash, no erythema  Lines: no phlebitis    TESTS & MEASUREMENTS:                        11.9   8.62  )-----------( 207      ( 04 Feb 2024 05:34 )             36.8     02-04    139  |  103  |  14  ----------------------------<  81  4.0   |  25  |  0.6<L>    Ca    8.8      04 Feb 2024 05:34  Mg     1.8     02-04    TPro  5.6<L>  /  Alb  3.5  /  TBili  0.3  /  DBili  x   /  AST  15  /  ALT  22  /  AlkPhos  80  02-04      LIVER FUNCTIONS - ( 04 Feb 2024 05:34 )  Alb: 3.5 g/dL / Pro: 5.6 g/dL / ALK PHOS: 80 U/L / ALT: 22 U/L / AST: 15 U/L / GGT: x           Urinalysis Basic - ( 04 Feb 2024 05:34 )    Color: x / Appearance: x / SG: x / pH: x  Gluc: 81 mg/dL / Ketone: x  / Bili: x / Urobili: x   Blood: x / Protein: x / Nitrite: x   Leuk Esterase: x / RBC: x / WBC x   Sq Epi: x / Non Sq Epi: x / Bacteria: x        Culture - Blood (collected 02-02-24 @ 11:11)  Source: .Blood None  Preliminary Report (02-04-24 @ 23:01):    No growth at 48 Hours        Lactate, Blood: 0.9 mmol/L (02-02-24 @ 05:00)  Blood Gas Venous - Lactate: 1.3 mmol/L (02-02-24 @ 01:57)  Blood Gas Venous - Lactate: 6.1 mmol/L (02-01-24 @ 20:26)    MEDICATIONS  (PRN):  doxycycline IVPB   100 mL/Hr IV Intermittent (02-03-24 @ 07:05)   100 mL/Hr IV Intermittent (02-02-24 @ 18:07)   100 mL/Hr IV Intermittent (02-02-24 @ 05:31)    levoFLOXacin IVPB   100 mL/Hr IV Intermittent (02-03-24 @ 07:09)   100 mL/Hr IV Intermittent (02-02-24 @ 06:14)    levoFLOXacin IVPB   100 mL/Hr IV Intermittent (02-04-24 @ 15:23)   100 mL/Hr IV Intermittent (02-03-24 @ 13:45)        levoFLOXacin IVPB 750 milliGRAM(s) IV Intermittent every 24 hours    INFECTIOUS DISEASES TESTING  Procalcitonin, Serum: 0.71 ng/mL (02-02-24 @ 11:11)      RADIOLOGY & ADDITIONAL TESTS:  I have personally reviewed the last Chest xray  CXR      CT      CARDIOLOGY TESTING  12 Lead ECG:   Ventricular Rate 74 BPM    Atrial Rate 74 BPM    P-R Interval 146 ms    QRS Duration 78 ms    Q-T Interval 410 ms    QTC Calculation(Bazett) 455 ms    P Axis 77 degrees    R Axis 64 degrees    T Axis 67 degrees    Diagnosis Line *** Poor data quality, interpretation may be adversely affected  Normal sinus rhythm  Possible Left atrial enlargement  Borderline ECG    Confirmed by RENA HUERTA MD ([id number]) on 2/2/2024 7:07:24 AM (02-01-24 @ 22:33)      MEDICATIONS  enoxaparin Injectable 40 SubCutaneous every 24 hours  folic acid 1 Oral daily  levoFLOXacin IVPB 750 IV Intermittent every 24 hours  multivitamin 1 Oral daily  pantoprazole    Tablet 40 Oral before breakfast  thiamine IVPB 500 IV Intermittent every 8 hours      Weight  Weight (kg): 68 (02-01-24 @ 20:04)    ANTIBIOTICS:  levoFLOXacin IVPB 750 milliGRAM(s) IV Intermittent every 24 hours      ALLERGIES:  penicillin (Unknown)  latex (Unknown)

## 2024-02-05 NOTE — DISCHARGE NOTE PROVIDER - HOSPITAL COURSE
43F PMHx crack cocaine use, alcohol abuse here with unresponsiveness s/p narcan in ED.     #Unresponsiveness.   in setting of crack cocaine use; pt believes may have been laced with fentanyl  resolved, a+o x3  cth neg  ct abd with fluid collection, bl ggo  no intervention per sx  pt denies injections or narcotic use  s/p narcan, mentation back to baseline - comprehending her care this am  -CATCH team eval   levaquin empiric abx for aspiration pnuemonitis     # Alcohol abuse.   drinks 1 pint liquor daily for last year-- is not withdrawing  -refrain from alcohol intake.    #Gluteal abscess-- not visible no tenderness seen by surgery. Patient injects drugs.  catch team eval.    Patient left AMA

## 2024-02-05 NOTE — DISCHARGE NOTE PROVIDER - NSDCMRMEDTOKEN_GEN_ALL_CORE_FT
folic acid 1 mg oral tablet: 1 tab(s) orally once a day  levoFLOXacin 750 mg oral tablet: 1 tab(s) orally once a day

## 2024-02-05 NOTE — DISCHARGE NOTE PROVIDER - NSDCCPCAREPLAN_GEN_ALL_CORE_FT
PRINCIPAL DISCHARGE DIAGNOSIS  Diagnosis: Pneumonia, aspiration  Assessment and Plan of Treatment: Pneumonia  Pneumonia is an infection of the lungs. Pneumonia may be caused by bacteria, viruses, or funguses. Symptoms include coughing, fever, chest pain when breathing deeply or coughing, shortness of breath, fatigue, or muscle aches. Pneumonia can be diagnosed with a medical history and physical exam, as well as other tests which may include a chest X-ray. If you were prescribed an antibiotic medicine, take it as told by your health care provider and do not stop taking the antibiotic even if you start to feel better. Do not use tobacco products, including cigarettes, chewing tobacco, and e-cigarettes.  SEEK IMMEDIATE MEDICAL CARE IF YOU HAVE ANY OF THE FOLLOWING SYMPTOMS: worsening shortness of breath, worsening chest pain, coughing up blood, change in mental status, lightheadedness/dizziness.        SECONDARY DISCHARGE DIAGNOSES  Diagnosis: Overdose  Assessment and Plan of Treatment:

## 2024-02-05 NOTE — CONSULT NOTE ADULT - ASSESSMENT
ASSESSMENT  43-year-old female with PMHx of cocaine and alcohol abuse & active smoker, BIBEMS after being found unresponsive on the ground in parking lot behind a liquor store.    IMPRESSION  #Unresponsiveness after crack cocaine use     #Aspiration Pneumonitis   - CT Abdomen and Pelvis w/ IV Cont (02.02.24 @ 01:15): 3.  Patchy opacities in the right middle and left lower lobes possibly  infectious or inflammatory. Given history of vomiting, aspiration cannot   be excluded.  - WBC Count: 20.50 K/uL (02.02.24 @ 05:00) -> WBC Count: 8.62 K/uL (02.04.24 @ 05:34)    #Incidental Right Gluteal Colelction   - CT Abdomen and Pelvis w/ IV Cont (02.02.24 @ 01:15): 2.  Right gluteal nonspecific fluid collection measuring 4 x 2 x 3.5 cm  with mild surrounding stranding (series 3 image 122), possibly related to   injection or procedural changes.    #ETOH Use disoder/Cocaine abuse     #Abx allergy: penicillin (Unknown)    RECOMMENDATIONS  This is a preliminary incomplete pended note, all final recommendations to follow after interview and examination of the patient.    Please call or message on [employer] Teams if with any questions.  Spectra [phone]     ASSESSMENT  43-year-old female with PMHx of cocaine and alcohol abuse & active smoker, BIBEMS after being found unresponsive on the ground in parking lot behind a liquor store.    IMPRESSION  #Unresponsiveness after crack cocaine use     #Aspiration Pneumonitis/Pneumonia  - CT Abdomen and Pelvis w/ IV Cont (02.02.24 @ 01:15): 3.  Patchy opacities in the right middle and left lower lobes possibly  infectious or inflammatory. Given history of vomiting, aspiration cannot   be excluded.  - WBC Count: 20.50 K/uL (02.02.24 @ 05:00) -> WBC Count: 8.62 K/uL (02.04.24 @ 05:34)    #Incidental Right Gluteal Colelction   - CT Abdomen and Pelvis w/ IV Cont (02.02.24 @ 01:15): 2.  Right gluteal nonspecific fluid collection measuring 4 x 2 x 3.5 cm  with mild surrounding stranding (series 3 image 122), possibly related to   injection or procedural changes.  - on exam, non-tender, no erythema     #ETOH Use disoder/Cocaine abuse     #Abx allergy: penicillin (Unknown) - reports childhood allergy - unsure of reaction     RECOMMENDATIONS  - switch to PO levofloxacin 750 mg daily -- last day tomorrow to complete 5 days     Please call or message on [employer] Teams if with any questions.  Spectra [phone]

## 2024-02-05 NOTE — DISCHARGE NOTE PROVIDER - CARE PROVIDER_API CALL
[facility] [address], Admin - Room [address] [place] [address]  Phone: [phone]  Fax: [phone]  Follow Up Time: 1 week

## 2024-02-06 LAB — DRUG SCREEN, SERUM: ABNORMAL

## 2024-02-07 LAB
CULTURE RESULTS: SIGNIFICANT CHANGE UP
SPECIMEN SOURCE: SIGNIFICANT CHANGE UP

## 2024-02-12 DIAGNOSIS — K70.9 ALCOHOLIC LIVER DISEASE, UNSPECIFIED: ICD-10-CM
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
## 2024-02-12 DIAGNOSIS — J96.02 ACUTE RESPIRATORY FAILURE WITH HYPERCAPNIA: ICD-10-CM

## 2024-02-12 DIAGNOSIS — J96.01 ACUTE RESPIRATORY FAILURE WITH HYPOXIA: ICD-10-CM

## 2024-02-12 DIAGNOSIS — Z53.29 PROCEDURE AND TREATMENT NOT CARRIED OUT BECAUSE OF PATIENT'S DECISION FOR OTHER REASONS: ICD-10-CM

## 2024-02-12 DIAGNOSIS — D72.829 ELEVATED WHITE BLOOD CELL COUNT, UNSPECIFIED: ICD-10-CM

## 2024-02-12 DIAGNOSIS — J69.0 PNEUMONITIS DUE TO INHALATION OF FOOD AND VOMIT: ICD-10-CM
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
## 2024-02-12 DIAGNOSIS — E87.29 OTHER ACIDOSIS: ICD-10-CM

## 2024-02-12 DIAGNOSIS — G92.8 OTHER TOXIC ENCEPHALOPATHY: ICD-10-CM

## 2024-02-12 DIAGNOSIS — T40.5X1A POISONING BY COCAINE, ACCIDENTAL (UNINTENTIONAL), INITIAL ENCOUNTER: ICD-10-CM
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
## 2024-02-12 DIAGNOSIS — S30.0XXA CONTUSION OF LOWER BACK AND PELVIS, INITIAL ENCOUNTER: ICD-10-CM
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
## 2024-02-12 DIAGNOSIS — Z86.19 PERSONAL HISTORY OF OTHER INFECTIOUS AND PARASITIC DISEASES: ICD-10-CM

## 2024-02-12 DIAGNOSIS — F10.10 ALCOHOL ABUSE, UNCOMPLICATED: ICD-10-CM

## 2024-02-12 DIAGNOSIS — Z85.41 PERSONAL HISTORY OF MALIGNANT NEOPLASM OF CERVIX UTERI: ICD-10-CM

## 2024-02-12 DIAGNOSIS — Z91.040 LATEX ALLERGY STATUS: ICD-10-CM

## 2024-02-12 DIAGNOSIS — Z88.0 ALLERGY STATUS TO PENICILLIN: ICD-10-CM

## 2025-05-24 ENCOUNTER — EMERGENCY (EMERGENCY)
Facility: HOSPITAL | Age: 45
LOS: 0 days | Discharge: HOME | End: 2025-05-24
Attending: EMERGENCY MEDICINE
Payer: MEDICAID

## 2025-05-24 VITALS
TEMPERATURE: 98 F | HEIGHT: 66 IN | WEIGHT: 210.1 LBS | HEART RATE: 79 BPM | OXYGEN SATURATION: 98 % | RESPIRATION RATE: 18 BRPM | DIASTOLIC BLOOD PRESSURE: 84 MMHG | SYSTOLIC BLOOD PRESSURE: 132 MMHG

## 2025-05-24 DIAGNOSIS — G89.29 OTHER CHRONIC PAIN: ICD-10-CM

## 2025-05-24 DIAGNOSIS — T73.0XXA STARVATION, INITIAL ENCOUNTER: ICD-10-CM

## 2025-05-24 DIAGNOSIS — Z88.0 ALLERGY STATUS TO PENICILLIN: ICD-10-CM

## 2025-05-24 DIAGNOSIS — M79.669 PAIN IN UNSPECIFIED LOWER LEG: ICD-10-CM

## 2025-05-24 DIAGNOSIS — M79.661 PAIN IN RIGHT LOWER LEG: ICD-10-CM

## 2025-05-24 DIAGNOSIS — Z91.013 ALLERGY TO SEAFOOD: ICD-10-CM

## 2025-05-24 DIAGNOSIS — J00 ACUTE NASOPHARYNGITIS [COMMON COLD]: ICD-10-CM

## 2025-05-24 DIAGNOSIS — R05.9 COUGH, UNSPECIFIED: ICD-10-CM
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
## 2025-05-24 DIAGNOSIS — Z91.040 LATEX ALLERGY STATUS: ICD-10-CM

## 2025-05-24 DIAGNOSIS — M79.662 PAIN IN LEFT LOWER LEG: ICD-10-CM
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
## 2025-05-24 PROCEDURE — 99283 EMERGENCY DEPT VISIT LOW MDM: CPT | Mod: 25

## 2025-05-24 PROCEDURE — 71046 X-RAY EXAM CHEST 2 VIEWS: CPT

## 2025-05-24 PROCEDURE — 71046 X-RAY EXAM CHEST 2 VIEWS: CPT | Mod: 26

## 2025-05-24 PROCEDURE — 93010 ELECTROCARDIOGRAM REPORT: CPT

## 2025-05-24 PROCEDURE — 93005 ELECTROCARDIOGRAM TRACING: CPT

## 2025-05-24 PROCEDURE — 99284 EMERGENCY DEPT VISIT MOD MDM: CPT | Mod: 25

## 2025-05-24 RX ORDER — IBUPROFEN 200 MG
600 TABLET ORAL ONCE
Refills: 0 | Status: COMPLETED | OUTPATIENT
Start: 2025-05-24 | End: 2025-05-24

## 2025-05-24 RX ORDER — METHOCARBAMOL 500 MG/1
2 TABLET, FILM COATED ORAL
Qty: 30 | Refills: 0
Start: 2025-05-24 | End: 2025-05-28

## 2025-05-24 RX ORDER — METHOCARBAMOL 500 MG/1
1500 TABLET, FILM COATED ORAL ONCE
Refills: 0 | Status: COMPLETED | OUTPATIENT
Start: 2025-05-24 | End: 2025-05-24

## 2025-05-24 RX ADMIN — Medication 600 MILLIGRAM(S): at 07:48

## 2025-05-24 RX ADMIN — METHOCARBAMOL 1500 MILLIGRAM(S): 500 TABLET, FILM COATED ORAL at 07:48

## 2025-07-22 ENCOUNTER — EMERGENCY (EMERGENCY)
Facility: HOSPITAL | Age: 45
LOS: 0 days | Discharge: HOME | End: 2025-07-22
Attending: EMERGENCY MEDICINE
Payer: MEDICAID

## 2025-07-22 VITALS
TEMPERATURE: 98 F | WEIGHT: 199.96 LBS | HEIGHT: 66 IN | HEART RATE: 65 BPM | SYSTOLIC BLOOD PRESSURE: 142 MMHG | OXYGEN SATURATION: 98 % | DIASTOLIC BLOOD PRESSURE: 87 MMHG | RESPIRATION RATE: 18 BRPM

## 2025-07-22 DIAGNOSIS — F17.210 NICOTINE DEPENDENCE, CIGARETTES, UNCOMPLICATED: ICD-10-CM

## 2025-07-22 DIAGNOSIS — F10.10 ALCOHOL ABUSE, UNCOMPLICATED: ICD-10-CM

## 2025-07-22 DIAGNOSIS — Z91.040 LATEX ALLERGY STATUS: ICD-10-CM

## 2025-07-22 DIAGNOSIS — Z88.0 ALLERGY STATUS TO PENICILLIN: ICD-10-CM

## 2025-07-22 PROCEDURE — 99282 EMERGENCY DEPT VISIT SF MDM: CPT | Mod: 25

## 2025-07-22 PROCEDURE — 99409 AUDIT/DAST OVER 30 MIN: CPT

## 2025-07-22 PROCEDURE — 99283 EMERGENCY DEPT VISIT LOW MDM: CPT

## 2025-07-22 NOTE — ED ADULT TRIAGE NOTE - CHIEF COMPLAINT QUOTE
Pt presents for detox , states she drinks approximately a pint a day, last drink prior to ED arrival

## 2025-07-22 NOTE — ED PROVIDER NOTE - NSFOLLOWUPINSTRUCTIONS_ED_ALL_ED_FT
Please follow up with your primary care physician within 24-72 hours and return immediately if symptoms worsen.    Alcohol Use Disorder  Alcohol use disorder is when your drinking disrupts your daily life. When you have this condition, you drink too much alcohol and you cannot control your drinking.    Alcohol use disorder can cause serious problems with your physical health. It can affect your brain, heart, liver, pancreas, immune system, stomach, and intestines. Alcohol use disorder can increase your risk for certain cancers and cause problems with your mental health, such as depression, anxiety, psychosis, delirium, and dementia. People with this disorder risk hurting themselves and others.    What are the causes?  This condition is caused by drinking too much alcohol over time. It is not caused by drinking too much alcohol only one or two times. Some people with this condition drink alcohol to cope with or escape from negative life events. Others drink to relieve pain or symptoms of mental illness.    What increases the risk?  You are more likely to develop this condition if:    You have a family history of alcohol use disorder.  Your culture encourages drinking to the point of intoxication, or makes alcohol easy to get.  You had a mood or conduct disorder in childhood.  You have been a victim of abuse.  You are an adolescent and:    You have poor grades or difficulties in school.  Your caregivers do not talk to you about saying no to alcohol, or supervise your activities.  You are impulsive or you have trouble with self-control.      What are the signs or symptoms?  Symptoms of this condition include:    Drinking more than you want to.  Drinking for longer than you want to.  Trying several times to drink less or to control your drinking.  Spending a lot of time getting alcohol, drinking, or recovering from drinking.  Craving alcohol.  Having problems at work, at school, or at home due to drinking.  Having problems in relationships due to drinking.  Drinking when it is dangerous to drink, such as before driving a car.  Continuing to drink even though you know you might have a physical or mental problem related to drinking.  Needing more and more alcohol to get the same effect you want from the alcohol (building up tolerance).  Having symptoms of withdrawal when you stop drinking. Symptoms of withdrawal include:    Fatigue.  Nightmares.  Trouble sleeping.  Depression.  Anxiety.  Fever.  Seizures.  Severe confusion.  Feeling or seeing things that are not there (hallucinations).  Tremors.  Rapid heart rate.  Rapid breathing.  High blood pressure.    Drinking to avoid symptoms of withdrawal.    How is this diagnosed?  This condition is diagnosed with an assessment. Your health care provider may start the assessment by asking three or four questions about your drinking.    Your health care provider may perform a physical exam or do lab tests to see if you have physical problems resulting from alcohol use. She or he may refer you to a mental health professional for evaluation.    How is this treated?  Some people with alcohol use disorder are able to reduce their alcohol use to low-risk levels. Others need to completely quit drinking alcohol. When necessary, mental health professionals with specialized training in substance use treatment can help. Your health care provider can help you decide how severe your alcohol use disorder is and what type of treatment you need. The following forms of treatment are available:    Detoxification. Detoxification involves quitting drinking and using prescription medicines within the first week to help lessen withdrawal symptoms. This treatment is important for people who have had withdrawal symptoms before and for heavy drinkers who are likely to have withdrawal symptoms. Alcohol withdrawal can be dangerous, and in severe cases, it can cause death. Detoxification may be provided in a home, community, or primary care setting, or in a hospital or substance use treatment facility.  Counseling. This treatment is also called talk therapy. It is provided by substance use treatment counselors. A counselor can address the reasons you use alcohol and suggest ways to keep you from drinking again or to prevent problem drinking. The goals of talk therapy are to:    Find healthy activities and ways for you to cope with stress.  Identify and avoid the things that trigger your alcohol use.  Help you learn how to handle cravings.    Medicines. Medicines can help treat alcohol use disorder by:    Decreasing alcohol cravings.  Decreasing the positive feeling you have when you drink alcohol.  Causing an uncomfortable physical reaction when you drink alcohol (aversion therapy).    Support groups. Support groups are led by people who have quit drinking. They provide emotional support, advice, and guidance.    ImageThese forms of treatment are often combined. Some people with this condition benefit from a combination of treatments provided by specialized substance use treatment centers.    Follow these instructions at home:  Take over-the-counter and prescription medicines only as told by your health care provider.  Check with your health care provider before starting any new medicines.  Ask friends and family members not to offer you alcohol.  Avoid situations where alcohol is served, including gatherings where others are drinking alcohol.  Create a plan for what to do when you are tempted to use alcohol.  Find hobbies or activities that you enjoy that do not include alcohol.  Keep all follow-up visits as told by your health care provider. This is important.  How is this prevented?  If you drink, limit alcohol intake to no more than 1 drink a day for nonpregnant women and 2 drinks a day for men. One drink equals 12 oz of beer, 5 oz of wine, or 1½ oz of hard liquor.  If you have a mental health condition, get treatment and support.  Do not give alcohol to adolescents.  If you are an adolescent:    Do not drink alcohol.  Do not be afraid to say no if someone offers you alcohol. Speak up about why you do not want to drink. You can be a positive role model for your friends and set a good example for those around you by not drinking alcohol.  If your friends drink, spend time with others who do not drink alcohol. Make new friends who do not use alcohol.  Find healthy ways to manage stress and emotions, such as meditation or deep breathing, exercise, spending time in nature, listening to music, or talking with a trusted friend or family member.    Contact a health care provider if:  You are not able to take your medicines as told.  Your symptoms get worse.  You return to drinking alcohol (relapse) and your symptoms get worse.  Get help right away if:  You have thoughts about hurting yourself or others.  If you ever feel like you may hurt yourself or others, or have thoughts about taking your own life, get help right away. You can go to your nearest emergency department or call:     Your local emergency services (911 in the [place]   A suicide crisis helpline, such as the National Suicide Prevention Lifeline at 1-865.286.4222. This is open 24 hours a day.     Summary  Alcohol use disorder is when your drinking disrupts your daily life. When you have this condition, you drink too much alcohol and you cannot control your drinking.  Treatment may include detoxification, counseling, medicine, and support groups.  Ask friends and family members not to offer you alcohol. Avoid situations where alcohol is served.  Get help right away if you have thoughts about hurting yourself or others.  This information is not intended to replace advice given to you by your health care provider. Make sure you discuss any questions you have with your health care provider.

## 2025-07-22 NOTE — ED ADULT NURSE NOTE - NSFALLCONCLUSION_ED_ALL_ED
Outgoing call: Spoke with patient regarding refill, due to inject on 10/29, 1 on hand for this dose. I informed the patient that a refill request was sent to his doctor and once approved [facility] will follow up.    Fall with Harm Risk

## 2025-07-22 NOTE — ED ADULT NURSE NOTE - NSFALLHARMRISKINTERV_ED_ALL_ED
Assistance OOB with selected safe patient handling equipment if applicable/Communicate risk of Fall with Harm to all staff, patient, and family/Provide visual cue: red socks, yellow wristband, yellow gown, etc/Reinforce activity limits and safety measures with patient and family/Bed in lowest position, wheels locked, appropriate side rails in place/Call bell, personal items and telephone in reach/Instruct patient to call for assistance before getting out of bed/chair/stretcher/Non-slip footwear applied when patient is off stretcher/[place] to call system/Physically safe environment - no spills, clutter or unnecessary equipment/Purposeful Proactive Rounding/Room/bathroom lighting operational, light cord in reach

## 2025-07-22 NOTE — SBIRT NOTE ADULT - NSSBIRTALCACTIVEREFTXDET_GEN_A_CORE
Patient provided with a referral to substance use treatment at [facility], [address] [place] [address]. Transportation to be provided by the program. Pt was provided this information and was in agreement with the plan.

## 2025-07-22 NOTE — ED PROVIDER NOTE - OBJECTIVE STATEMENT
44-year-old female with a past medical history of alcohol abuse presents requesting detox.  Patient states to drink 1 pint of liquor daily and her last drink was 1 hour prior to arrival. pt denies any other symptoms including fevers, chill, headache, recent illness/travel, cough, abdominal pain, chest pain, or SOB.

## 2025-07-22 NOTE — SBIRT NOTE ADULT - NSSBIRTBRIEFINTDET_GEN_A_CORE
Screening results were reviewed with the patient. Patient was provided educational materials on low-risk guidelines and substance use and health. Motivation and goals were discussed.

## 2025-07-22 NOTE — ED PROVIDER NOTE - CLINICAL SUMMARY MEDICAL DECISION MAKING FREE TEXT BOX
44-year-old female with past medical history of alcohol use presents to the emergency department requesting detoxification.  Patient admits to drinking 1 pint of vodka daily; last drink was 1 hour prior to arrival.  Patient states she has never been admitted for withdrawal and has never had withdrawal seizures.  Patient has not had any recent fever, headache, dizziness, difficulty breathing, chest pain, nausea, vomiting, diarrhea, anxiety, auditory/visual/tactile hallucinations.    PHYSICAL EXAM: I have reviewed current vital signs.  GENERAL: NAD, well-nourished; well-developed.  HEAD:  Normocephalic, atraumatic.  EYES: EOMI, PERRL, conjunctiva and sclera clear.  ENT: MMM, no erythema/exudates.  No tongue fasciculations.  CHEST/LUNG: Clear to auscultation bilaterally; no wheezes, rales, or rhonchi.  HEART: Regular rate and rhythm, normal S1 and S2; no murmurs, rubs, or gallops.  ABDOMEN: Soft, nontender, nondistended.  EXTREMITIES:  2+ peripheral pulses; no clubbing, cyanosis, or edema.  No extremity tremors.  PSYCH: Cooperative, appropriate, normal mood and affect.  NEUROLOGY: A&O x 3. Motor 5/5. Sensory intact. No focal neurological deficits.  SKIN: Warm and dry.    Patient is not exhibiting signs and symptoms of withdrawal at this time.  Patient evaluated by SBIRT who was able to arrange for transportation to detox center in [facility].  Patient is amenable with plan and stable for discharge.

## 2025-07-22 NOTE — ED PROVIDER NOTE - PATIENT PORTAL LINK FT
Bedside shift report completed with Ronnie HELM  Safety, toileting, ongoing plan of care, and pain were reviewed  Lines traced/IV pump settings double checked/IV site assessed  MAR reconciled and critical drips verified  LOC, Activity level, PO status and vital sign trends reviewed   Monitor alarm limits on and set per order/protocol   Patient and family when available participated in handoff
I have reviewed discharge instruction and prescriptions with patient and family. All verbalized understanding and has no further questions at this time. Education taught and patient verbalized understanding of education. Teach back method used. Left ac IV removed, catheter tip intact on removal.  Armband removed and shredded per patients request.    Patients pain 0/10. Belongings given to patient. Patient discharged with children to home.
Pt awaiting transport home.
Report from nicanor and myself to rony.
You can access the FollowMyHealth Patient Portal offered by [facility] by registering at the following website: [email or web address]. By joining [employer]’s FollowMyHealth portal, you will also be able to view your health information using other applications (apps) compatible with our system.